# Patient Record
Sex: FEMALE | Race: WHITE | Employment: OTHER | URBAN - NONMETROPOLITAN AREA
[De-identification: names, ages, dates, MRNs, and addresses within clinical notes are randomized per-mention and may not be internally consistent; named-entity substitution may affect disease eponyms.]

---

## 2017-03-22 ENCOUNTER — OFFICE VISIT (OUTPATIENT)
Dept: FAMILY MEDICINE CLINIC | Age: 27
End: 2017-03-22
Payer: COMMERCIAL

## 2017-03-22 VITALS
RESPIRATION RATE: 16 BRPM | HEIGHT: 62 IN | HEART RATE: 90 BPM | DIASTOLIC BLOOD PRESSURE: 86 MMHG | SYSTOLIC BLOOD PRESSURE: 132 MMHG | OXYGEN SATURATION: 98 % | WEIGHT: 162.4 LBS | BODY MASS INDEX: 29.88 KG/M2

## 2017-03-22 DIAGNOSIS — N39.0 RECURRENT UTI: ICD-10-CM

## 2017-03-22 DIAGNOSIS — E04.2 MULTIPLE THYROID NODULES: ICD-10-CM

## 2017-03-22 DIAGNOSIS — F31.78 BIPOLAR DISORDER, IN FULL REMISSION, MOST RECENT EPISODE MIXED (HCC): Primary | ICD-10-CM

## 2017-03-22 PROCEDURE — 99214 OFFICE O/P EST MOD 30 MIN: CPT | Performed by: FAMILY MEDICINE

## 2017-03-22 RX ORDER — CLONAZEPAM 1 MG/1
TABLET ORAL
Refills: 0 | COMMUNITY
Start: 2017-02-13 | End: 2017-11-08 | Stop reason: ALTCHOICE

## 2017-03-22 RX ORDER — ASENAPINE MALEATE 10 MG/1
TABLET SUBLINGUAL
Refills: 0 | COMMUNITY
Start: 2017-03-02 | End: 2017-11-08 | Stop reason: ALTCHOICE

## 2017-03-22 RX ORDER — LAMOTRIGINE 25 MG/1
TABLET ORAL
Refills: 0 | COMMUNITY
Start: 2017-03-02 | End: 2017-11-08 | Stop reason: DRUGHIGH

## 2017-03-22 ASSESSMENT — ENCOUNTER SYMPTOMS
DIARRHEA: 0
WHEEZING: 0
CONSTIPATION: 0
NAUSEA: 0
SHORTNESS OF BREATH: 0
VOMITING: 0
BLOOD IN STOOL: 0

## 2017-11-07 ENCOUNTER — TELEPHONE (OUTPATIENT)
Dept: FAMILY MEDICINE CLINIC | Age: 27
End: 2017-11-07

## 2017-11-07 NOTE — TELEPHONE ENCOUNTER
Pt called- is at hospital for a group session- states she is in a \"manic phase and her BP always goes up\" was 180/100's yesterday and 150's over 100's today. Asking for an appt with Dr Juanpablo Hale or NP- routed call to preservice- did advise her to got to ER if worsening- she said it wasn't necessary.

## 2017-11-08 ENCOUNTER — OFFICE VISIT (OUTPATIENT)
Dept: FAMILY MEDICINE CLINIC | Age: 27
End: 2017-11-08
Payer: COMMERCIAL

## 2017-11-08 VITALS
HEART RATE: 80 BPM | WEIGHT: 189 LBS | SYSTOLIC BLOOD PRESSURE: 154 MMHG | HEIGHT: 62 IN | DIASTOLIC BLOOD PRESSURE: 116 MMHG | BODY MASS INDEX: 34.78 KG/M2

## 2017-11-08 DIAGNOSIS — R03.0 ELEVATED BLOOD PRESSURE READING WITHOUT DIAGNOSIS OF HYPERTENSION: Primary | ICD-10-CM

## 2017-11-08 DIAGNOSIS — F31.62 BIPOLAR DISORDER, CURRENT EPISODE MIXED, MODERATE (HCC): ICD-10-CM

## 2017-11-08 PROCEDURE — 99213 OFFICE O/P EST LOW 20 MIN: CPT | Performed by: FAMILY MEDICINE

## 2017-11-08 RX ORDER — QUETIAPINE FUMARATE 400 MG/1
300 TABLET, FILM COATED ORAL 2 TIMES DAILY
COMMUNITY
End: 2017-12-13 | Stop reason: DRUGHIGH

## 2017-11-08 RX ORDER — LAMOTRIGINE 100 MG/1
300 TABLET ORAL NIGHTLY
Refills: 0 | COMMUNITY
Start: 2017-10-18 | End: 2018-10-03 | Stop reason: ALTCHOICE

## 2017-11-08 RX ORDER — SERTRALINE HYDROCHLORIDE 100 MG/1
50 TABLET, FILM COATED ORAL NIGHTLY
Status: ON HOLD | COMMUNITY
End: 2019-08-26 | Stop reason: HOSPADM

## 2017-11-08 RX ORDER — LISINOPRIL 10 MG/1
10 TABLET ORAL DAILY
Qty: 30 TABLET | Refills: 0 | Status: SHIPPED | OUTPATIENT
Start: 2017-11-08 | End: 2017-12-13 | Stop reason: SDUPTHER

## 2017-11-08 RX ORDER — QUETIAPINE FUMARATE 100 MG/1
TABLET, FILM COATED ORAL
Refills: 0 | COMMUNITY
Start: 2017-11-03 | End: 2017-11-28 | Stop reason: DRUGHIGH

## 2017-11-08 ASSESSMENT — PATIENT HEALTH QUESTIONNAIRE - PHQ9
SUM OF ALL RESPONSES TO PHQ QUESTIONS 1-9: 0
SUM OF ALL RESPONSES TO PHQ9 QUESTIONS 1 & 2: 0
2. FEELING DOWN, DEPRESSED OR HOPELESS: 0
1. LITTLE INTEREST OR PLEASURE IN DOING THINGS: 0

## 2017-11-08 ASSESSMENT — ENCOUNTER SYMPTOMS: NAUSEA: 1

## 2017-11-08 NOTE — PATIENT INSTRUCTIONS
Patient Education        Elevated Blood Pressure: Care Instructions  Your Care Instructions    Blood pressure is a measure of how hard the blood pushes against the walls of your arteries. It's normal for blood pressure to go up and down throughout the day. But if it stays up over time, you have high blood pressure. Two numbers tell you your blood pressure. The first number is the systolic pressure. It shows how hard the blood pushes when your heart is pumping. The second number is the diastolic pressure. It shows how hard the blood pushes between heartbeats, when your heart is relaxed and filling with blood. An ideal blood pressure in adults is less than 120/80 (say \"120 over 80\"). High blood pressure is 140/90 or higher. You have high blood pressure if your top number is 140 or higher or your bottom number is 90 or higher, or both. The main test for high blood pressure is simple, fast, and painless. To diagnose high blood pressure, your doctor will test your blood pressure at different times. After testing your blood pressure, your doctor may ask you to test it again when you are home. If you are diagnosed with high blood pressure, you can work with your doctor to make a long-term plan to manage it. Follow-up care is a key part of your treatment and safety. Be sure to make and go to all appointments, and call your doctor if you are having problems. It's also a good idea to know your test results and keep a list of the medicines you take. How can you care for yourself at home? · Do not smoke. Smoking increases your risk for heart attack and stroke. If you need help quitting, talk to your doctor about stop-smoking programs and medicines. These can increase your chances of quitting for good. · Stay at a healthy weight. · Try to limit how much sodium you eat to less than 2,300 milligrams (mg) a day. Your doctor may ask you to try to eat less than 1,500 mg a day. · Be physically active.  Get at least 30

## 2017-11-08 NOTE — PROGRESS NOTES
[address] [place], [address], [place], [address] [place] [address]  [phone]      Jose Calabrese is a 33 y.o. female who presents today for her medical conditions/complaints as noted below. Jose Calabrese is c/o of Hypertension (elevated readings at [address] program)      HPI:     Over the past 3 weeks, she has been in a \"moderate manic episode\" per Dr. Caridad Keller (Psychiatry) - diagnosed 2 days ago, along with panic disorder. Has had increase in shopping habits and sexual behavior; also feels that colors and other things are very \"vivid\". Having approx 3-4 panic attacks per day - takes Xanax as needed for these. Has been seeing his wife (Dr. Nii Carmona) every month x past year; also seeing GEE (counselor) at [facility] in [place] every week. Has now been in partial hospitalization program x past 5 days - will have this for a set 5-7 days, and then see how she is doing. Goes to [facility] program from 9-3. Now taking Lamictal 300 mg nightly, Seroquel 500 mg nightly, and Zoloft 50 mg nightly. Seroquel dose was temporarily decreased, but her symptoms worsened, so then they increased the dose further; Lamictal has remained the same. Has already noticed improvement x past 3 days. Last year, when she had a severe manic episode, she had increase in BP to \"dangerous levels\". Has noticed that this has been happening again with her recent increase in manic symptoms and panic attacks. Has been waxing/waning over the past few days. Yesterday, BP was 169/109. Past Medical History:   Diagnosis Date    Bipolar disease, manic ([address]     Hypertension       History reviewed. No pertinent surgical history.   Family History   Problem Relation Age of Onset    Depression Mother     High Blood Pressure Father      Social History   Substance Use Topics    Smoking status: Former Smoker    Smokeless tobacco: Never Used    Alcohol use No      Current Outpatient Prescriptions   Medication Sig Dispense Refill    lamoTRIgine (LAMICTAL) 100 MG tablet Take 300 mg by mouth nightly  0    QUEtiapine (SEROQUEL) 100 MG tablet take 1 tablet by mouth at bedtime TAKE WITH 400 MG TAB TO EQUAL 400 MG AT BEDTIME  0    QUEtiapine (SEROQUEL) 400 MG tablet Take 400 mg by mouth nightly      sertraline (ZOLOFT) 50 MG tablet Take 50 mg by mouth nightly      lisinopril (PRINIVIL;ZESTRIL) 10 MG tablet Take 1 tablet by mouth daily 30 tablet 0    ALPRAZolam (XANAX) 1 MG tablet Take 1 tablet by mouth 4 times daily as needed  . 0    EPINEPHrine (EPIPEN 2-WENDY) 0.3 MG/0.3ML SOAJ injection Use as directed for allergic reaction 1 each 0     No current facility-administered medications for this visit. Allergies   Allergen Reactions    Bee Venom Anaphylaxis       Health Maintenance   Topic Date Due    Flu vaccine (1) 02/08/2018 (Originally 9/1/2017)    HIV screen  11/08/2018 (Originally 2/19/2006)    Cervical cancer screen  09/15/2019    DTaP/Tdap/Td vaccine (2 - Td) 07/28/2026       Subjective:      Review of Systems   Eyes: Negative for visual disturbance. Cardiovascular: Negative for leg swelling. Gastrointestinal: Positive for nausea (intermittent). Neurological: Positive for light-headedness (relates this to her Seroquel increase) and headaches (gets migraines after panic attacks; takes Midol as needed). Objective:     Vitals:    11/08/17 0820 11/08/17 0938   BP: (!) 148/108 (!) 154/116   Site: Right Arm Right Arm   Position: Sitting Sitting   Cuff Size: Large Adult Large Adult   Pulse: 80    Weight: 189 lb (85.7 kg)    Height: 5' 2\" (1.575 m)      Physical Exam   Constitutional: She is oriented to person, place, and time. She appears well-developed and well-nourished. No distress. HENT:   Head: Normocephalic and atraumatic. Eyes: Conjunctivae are normal.   Neck: Neck supple. Cardiovascular: Normal rate, regular rhythm and normal heart sounds.     Pulmonary/Chest: Effort normal and breath sounds

## 2017-11-28 ENCOUNTER — OFFICE VISIT (OUTPATIENT)
Dept: FAMILY MEDICINE CLINIC | Age: 27
End: 2017-11-28
Payer: COMMERCIAL

## 2017-11-28 ENCOUNTER — HOSPITAL ENCOUNTER (OUTPATIENT)
Dept: LAB | Age: 27
Setting detail: SPECIMEN
Discharge: HOME | End: 2017-11-28
Payer: COMMERCIAL

## 2017-11-28 VITALS
SYSTOLIC BLOOD PRESSURE: 122 MMHG | WEIGHT: 195.6 LBS | BODY MASS INDEX: 35.78 KG/M2 | OXYGEN SATURATION: 96 % | DIASTOLIC BLOOD PRESSURE: 86 MMHG | HEART RATE: 93 BPM

## 2017-11-28 DIAGNOSIS — N30.00 ACUTE CYSTITIS WITHOUT HEMATURIA: ICD-10-CM

## 2017-11-28 DIAGNOSIS — R35.0 URINARY FREQUENCY: ICD-10-CM

## 2017-11-28 DIAGNOSIS — R35.0 URINARY FREQUENCY: Primary | ICD-10-CM

## 2017-11-28 LAB
-: ABNORMAL
AMORPHOUS: ABNORMAL
BACTERIA: ABNORMAL
BILIRUBIN URINE: NEGATIVE
CASTS UA: ABNORMAL /LPF (ref 0–2)
COLOR: ABNORMAL
COMMENT UA: ABNORMAL
CRYSTALS, UA: ABNORMAL /HPF
EPITHELIAL CELLS UA: ABNORMAL /HPF (ref 0–5)
GLUCOSE URINE: ABNORMAL
KETONES, URINE: NEGATIVE
LEUKOCYTE ESTERASE, URINE: ABNORMAL
MUCUS: ABNORMAL
NITRITE, URINE: POSITIVE
OTHER OBSERVATIONS UA: ABNORMAL
PH UA: 7 (ref 5–6)
PROTEIN UA: ABNORMAL
RBC UA: ABNORMAL /HPF (ref 0–4)
RENAL EPITHELIAL, UA: ABNORMAL /HPF
SPECIFIC GRAVITY UA: 1.01 (ref 1.01–1.02)
TRICHOMONAS: ABNORMAL
TURBIDITY: ABNORMAL
URINE HGB: ABNORMAL
UROBILINOGEN, URINE: ABNORMAL
WBC UA: >50 /HPF (ref 0–4)
YEAST: ABNORMAL

## 2017-11-28 PROCEDURE — 87086 URINE CULTURE/COLONY COUNT: CPT

## 2017-11-28 PROCEDURE — 81025 URINE PREGNANCY TEST: CPT | Performed by: NURSE PRACTITIONER

## 2017-11-28 PROCEDURE — 87088 URINE BACTERIA CULTURE: CPT

## 2017-11-28 PROCEDURE — 81001 URINALYSIS AUTO W/SCOPE: CPT

## 2017-11-28 PROCEDURE — 87186 SC STD MICRODIL/AGAR DIL: CPT

## 2017-11-28 PROCEDURE — 99213 OFFICE O/P EST LOW 20 MIN: CPT | Performed by: NURSE PRACTITIONER

## 2017-11-28 RX ORDER — NITROFURANTOIN 25; 75 MG/1; MG/1
100 CAPSULE ORAL 2 TIMES DAILY
Qty: 10 CAPSULE | Refills: 0 | Status: SHIPPED | OUTPATIENT
Start: 2017-11-28 | End: 2017-12-03

## 2017-11-28 ASSESSMENT — ENCOUNTER SYMPTOMS
ABDOMINAL PAIN: 0
SHORTNESS OF BREATH: 0
CHEST TIGHTNESS: 0
VOMITING: 0

## 2017-11-28 NOTE — PROGRESS NOTES
Medications    nitrofurantoin, macrocrystal-monohydrate, (MACROBID) 100 MG capsule     Sig: Take 1 capsule by mouth 2 times daily for 5 days     Dispense:  10 capsule     Refill:  0     UTI - POCT UA positive, will treat with macrobid as above, encourage fluids. Follow up if symptoms worsen or fail to resolve. Patient given educational materials - see patient instructions. Discussed use, benefit, and side effects of prescribed medications. All patient questions answered. Pt voiced understanding.        Electronically signed by CHRISTOPHER Hinson on 11/28/2017 at 7:28 PM

## 2017-12-01 LAB
CULTURE: ABNORMAL
CULTURE: ABNORMAL
Lab: ABNORMAL
ORGANISM: ABNORMAL
SPECIMEN DESCRIPTION: ABNORMAL
SPECIMEN DESCRIPTION: ABNORMAL
STATUS: ABNORMAL

## 2017-12-13 ENCOUNTER — OFFICE VISIT (OUTPATIENT)
Dept: FAMILY MEDICINE CLINIC | Age: 27
End: 2017-12-13
Payer: COMMERCIAL

## 2017-12-13 VITALS
BODY MASS INDEX: 36.76 KG/M2 | WEIGHT: 201 LBS | DIASTOLIC BLOOD PRESSURE: 100 MMHG | OXYGEN SATURATION: 96 % | SYSTOLIC BLOOD PRESSURE: 140 MMHG | HEART RATE: 90 BPM

## 2017-12-13 DIAGNOSIS — R03.0 ELEVATED BLOOD PRESSURE READING WITHOUT DIAGNOSIS OF HYPERTENSION: Primary | ICD-10-CM

## 2017-12-13 DIAGNOSIS — F31.81 HYPOMANIC BIPOLAR II DISORDER (HCC): ICD-10-CM

## 2017-12-13 DIAGNOSIS — F41.0 PANIC DISORDER: ICD-10-CM

## 2017-12-13 PROCEDURE — 99213 OFFICE O/P EST LOW 20 MIN: CPT | Performed by: FAMILY MEDICINE

## 2017-12-13 RX ORDER — LISINOPRIL 10 MG/1
10 TABLET ORAL DAILY
Qty: 30 TABLET | Refills: 2 | Status: SHIPPED | OUTPATIENT
Start: 2017-12-13 | End: 2018-03-27 | Stop reason: SDUPTHER

## 2017-12-13 RX ORDER — QUETIAPINE FUMARATE 200 MG/1
1 TABLET, FILM COATED ORAL NIGHTLY
Refills: 0 | COMMUNITY
Start: 2017-11-30 | End: 2018-10-03 | Stop reason: ALTCHOICE

## 2017-12-13 RX ORDER — QUETIAPINE FUMARATE 300 MG/1
600 TABLET, FILM COATED ORAL NIGHTLY
COMMUNITY
End: 2018-10-03 | Stop reason: ALTCHOICE

## 2017-12-13 ASSESSMENT — ENCOUNTER SYMPTOMS
SHORTNESS OF BREATH: 0
NAUSEA: 1
COUGH: 0

## 2017-12-13 NOTE — PROGRESS NOTES
[place] [address] [address] [place], [address] [place] [address]  [phone]      Gladys Camara is a 33 y.o. female who presents today for her medical conditions/complaints as noted below. Gladys Camara is c/o of Hypertension (4 week follow-up.)      HPI:     Pt here today for follow-up of elevated BP. Pt was having normal BP readings after her last OV with me at [facility] - 120/80's while there after starting Lisinopril 10 mg daily. Last reading when she saw Dr. Mayco Carney for appt was normal as well; also just saw [address] for UTI on 11/28, and BP was 122/86. No side effects. Pt going to see her counselor twice per week, and meets with Dr. Mayco Carney once per month. Still having some dysuria since her UTI on 11/28 - progressively improving. Denies urgency or severe burning; no frequency. Only happening intermittently, but states she is about 90% improved. Using OTC cranberry tabs and another OTC UTI medication. Past Medical History:   Diagnosis Date    Bipolar disease, manic ([address]     Hypertension     Panic disorder 10/2017    Dx by Dr. Mayco Carney from [facility]      History reviewed. No pertinent surgical history. Family History   Problem Relation Age of Onset    Depression Mother     High Blood Pressure Father      Social History   Substance Use Topics    Smoking status: Former Smoker    Smokeless tobacco: Never Used    Alcohol use No      Current Outpatient Prescriptions   Medication Sig Dispense Refill    QUEtiapine (SEROQUEL) 200 MG tablet Take 1 tablet by mouth nightly  0    QUEtiapine (SEROQUEL) 300 MG tablet Take 600 mg by mouth nightly       lisinopril (PRINIVIL;ZESTRIL) 10 MG tablet Take 1 tablet by mouth daily 30 tablet 2    lamoTRIgine (LAMICTAL) 100 MG tablet Take 300 mg by mouth nightly  0    sertraline (ZOLOFT) 50 MG tablet Take 50 mg by mouth nightly      ALPRAZolam (XANAX) 1 MG tablet Take 1 tablet by mouth 4 times daily as needed  .   0    EPINEPHrine (EPIPEN 2-WENDY) 0.3 MG/0.3ML

## 2018-03-27 DIAGNOSIS — R03.0 ELEVATED BLOOD PRESSURE READING WITHOUT DIAGNOSIS OF HYPERTENSION: ICD-10-CM

## 2018-03-28 RX ORDER — LISINOPRIL 10 MG/1
TABLET ORAL
Qty: 30 TABLET | Refills: 5 | Status: SHIPPED | OUTPATIENT
Start: 2018-03-28 | End: 2018-09-24 | Stop reason: SDUPTHER

## 2018-09-24 DIAGNOSIS — R03.0 ELEVATED BLOOD PRESSURE READING WITHOUT DIAGNOSIS OF HYPERTENSION: ICD-10-CM

## 2018-09-26 RX ORDER — LISINOPRIL 10 MG/1
TABLET ORAL
Qty: 30 TABLET | Refills: 2 | Status: SHIPPED | OUTPATIENT
Start: 2018-09-26 | End: 2018-12-17 | Stop reason: SDUPTHER

## 2018-10-03 ENCOUNTER — OFFICE VISIT (OUTPATIENT)
Dept: FAMILY MEDICINE CLINIC | Age: 28
End: 2018-10-03
Payer: COMMERCIAL

## 2018-10-03 VITALS
HEART RATE: 80 BPM | WEIGHT: 219 LBS | BODY MASS INDEX: 40.3 KG/M2 | HEIGHT: 62 IN | SYSTOLIC BLOOD PRESSURE: 114 MMHG | DIASTOLIC BLOOD PRESSURE: 80 MMHG | OXYGEN SATURATION: 98 %

## 2018-10-03 DIAGNOSIS — E04.1 THYROID NODULE: ICD-10-CM

## 2018-10-03 DIAGNOSIS — I10 ESSENTIAL HYPERTENSION: Primary | ICD-10-CM

## 2018-10-03 DIAGNOSIS — K62.5 RECTAL BLEEDING: ICD-10-CM

## 2018-10-03 DIAGNOSIS — Z91.030 ALLERGY TO BEE STING: ICD-10-CM

## 2018-10-03 PROCEDURE — 99214 OFFICE O/P EST MOD 30 MIN: CPT | Performed by: FAMILY MEDICINE

## 2018-10-03 RX ORDER — DIVALPROEX SODIUM 500 MG/1
TABLET, EXTENDED RELEASE ORAL
COMMUNITY
End: 2019-08-22 | Stop reason: DRUGHIGH

## 2018-10-03 RX ORDER — EPINEPHRINE 0.3 MG/.3ML
INJECTION SUBCUTANEOUS
Qty: 1 EACH | Refills: 1 | Status: SHIPPED | OUTPATIENT
Start: 2018-10-03 | End: 2019-09-04 | Stop reason: SDUPTHER

## 2018-10-03 ASSESSMENT — ENCOUNTER SYMPTOMS
SHORTNESS OF BREATH: 0
BLOOD IN STOOL: 1

## 2018-10-03 ASSESSMENT — PATIENT HEALTH QUESTIONNAIRE - PHQ9
2. FEELING DOWN, DEPRESSED OR HOPELESS: 0
SUM OF ALL RESPONSES TO PHQ QUESTIONS 1-9: 0
SUM OF ALL RESPONSES TO PHQ9 QUESTIONS 1 & 2: 0
SUM OF ALL RESPONSES TO PHQ QUESTIONS 1-9: 0
1. LITTLE INTEREST OR PLEASURE IN DOING THINGS: 0

## 2018-10-03 NOTE — PROGRESS NOTES
Anesthesia Evaluation     Patient summary reviewed and Nursing notes reviewed   no history of anesthetic complications:  NPO Solid Status: > 8 hours  NPO Liquid Status: > 8 hours           Airway   Mallampati: II  TM distance: >3 FB  Neck ROM: full  no difficulty expected  Dental - normal exam     Pulmonary - negative pulmonary ROS and normal exam   Cardiovascular - normal exam    Rhythm: regular  Rate: normal    (+) hypertension well controlled,       Neuro/Psych- negative ROS  GI/Hepatic/Renal/Endo - negative ROS     Musculoskeletal (-) negative ROS    Abdominal    Substance History - negative use     OB/GYN negative ob/gyn ROS         Other - negative ROS                       Anesthesia Plan    ASA 2     MAC   (Pt told that intravenous sedation will be used as the primary anesthetic along with local anesthesia if necessary. Every effort will be made to make sure the patient is comfortable.     The patient was told they may or may not have recall for the procedure. It was further explained that if the MAC was not adequate that a general anesthetic with either an LMA or endotracheal tube would be required.     Will proceed with the plan of care.)  intravenous induction   Anesthetic plan and risks discussed with patient.       [address]  [phone]      Shara Mohs is a 33 y.o. female who presents today for her medical conditions/complaints as noted below. Shara Mohs is c/o of Hypertension      HPI:     Pt here today for follow-up of HTN. BP well-controlled today - 114/80. Pt has been checking her BP regularly with Dr. Ofelia Mayers (Psychiatry) at [facility] once monthly - usually 110's/80's. Taking Lisinopril 10 mg nightly for HTN - stable. Had run out of her Lisinopril for a few days, and she got HA x 2-3 days and felt like her heart was \"racing\". Taking Zoloft 50 mg nightly, along with Depakote ER 1000 mg qam and 1500 mg qpm (just changed to this approx 3 weeks ago due to serum levels) and Vraylar 6 mg daily. Dr. Ofelia Mayers prescribes all 3 of these medications - seeing her once per monthly; may be stopping her Cristina Hoyos sometime soon in the future. Also seeing counselor Victor Manuel Carrasco at [facility] in [place] every 2 weeks; does EMDR with her. Has Epi-pen at home due to allergies to bees - thinks it is likely ; needs refill for new one today. Past Medical History:   Diagnosis Date    Bipolar disease, manic ([address]     Hypertension     Panic disorder 10/2017    Dx by Dr. Ofelia Mayers from [facility]      History reviewed. No pertinent surgical history. Family History   Problem Relation Age of Onset    Depression Mother     High Blood Pressure Father      Social History   Substance Use Topics    Smoking status: Former Smoker    Smokeless tobacco: Never Used    Alcohol use No      Current Outpatient Prescriptions   Medication Sig Dispense Refill    divalproex (DEPAKOTE ER) 500 MG extended release tablet Take 2 tabs every morning and 3 tabs every evening.        Cariprazine HCl (VRAYLAR) 6 MG CAPS Take 1 capsule by mouth daily      EPINEPHrine (EPIPEN 2-WENDY) 0.3 MG/0.3ML SOAJ injection Use as directed for allergic reaction 1 each 1    lisinopril (PRINIVIL;ZESTRIL) 10 MG tablet take 1 tablet by mouth once daily 30 tablet 2    sertraline (ZOLOFT) 50 MG tablet Take 50 mg by mouth nightly       No current facility-administered medications for this visit. Allergies   Allergen Reactions    Bee Venom Anaphylaxis       Health Maintenance   Topic Date Due    Flu vaccine (1) 09/01/2018    HIV screen  11/08/2018 (Originally 2/19/2006)    Potassium monitoring  01/21/2019    Creatinine monitoring  01/21/2019    Cervical cancer screen  09/15/2019    DTaP/Tdap/Td vaccine (2 - Td) 07/28/2026       Subjective:      Review of Systems   HENT: Negative for hearing loss. Eyes: Negative for visual disturbance. Respiratory: Negative for shortness of breath. Cardiovascular: Negative for chest pain. Gastrointestinal: Positive for blood in stool (intermittent, started approx 3 weeks ago x 3-4 episodes. Blood is bright red, sometimes dark; usually when she wipes, but on one occasion, she had dripping blood into the toilet bowl. Had some pain with BM's at those times. Has not happened in past week). Genitourinary: Negative for dysuria, hematuria and menstrual problem (LMP 9/20/18 - regular). Neurological: Negative for dizziness and light-headedness. Psychiatric/Behavioral: Positive for dysphoric mood (stable). Negative for suicidal ideas. The patient is nervous/anxious (stable). Objective:     Vitals:    10/03/18 1020   BP: 114/80   Site: Right Upper Arm   Position: Sitting   Cuff Size: Large Adult   Pulse: 80   SpO2: 98%   Weight: 219 lb (99.3 kg)   Height: 5' 2\" (1.575 m)     Physical Exam   Constitutional: She is oriented to person, place, and time. She appears well-developed and well-nourished. No distress. HENT:   Head: Normocephalic and atraumatic. Eyes: Conjunctivae are normal.   Neck: Neck supple. Thyromegaly (palpable nodule in R thyroid) present. Cardiovascular: Normal rate, regular rhythm and normal heart sounds.     Pulmonary/Chest: Effort normal and breath sounds

## 2018-12-17 DIAGNOSIS — R03.0 ELEVATED BLOOD PRESSURE READING WITHOUT DIAGNOSIS OF HYPERTENSION: ICD-10-CM

## 2018-12-18 RX ORDER — LISINOPRIL 10 MG/1
TABLET ORAL
Qty: 30 TABLET | Refills: 11 | Status: ON HOLD | OUTPATIENT
Start: 2018-12-18 | End: 2019-08-26 | Stop reason: HOSPADM

## 2019-04-02 ENCOUNTER — OFFICE VISIT (OUTPATIENT)
Dept: FAMILY MEDICINE CLINIC | Age: 29
End: 2019-04-02
Payer: COMMERCIAL

## 2019-04-02 VITALS
HEIGHT: 63 IN | DIASTOLIC BLOOD PRESSURE: 70 MMHG | WEIGHT: 218.8 LBS | SYSTOLIC BLOOD PRESSURE: 106 MMHG | OXYGEN SATURATION: 96 % | HEART RATE: 112 BPM | BODY MASS INDEX: 38.77 KG/M2 | TEMPERATURE: 100.1 F

## 2019-04-02 DIAGNOSIS — J02.9 SORE THROAT: ICD-10-CM

## 2019-04-02 DIAGNOSIS — J10.1 INFLUENZA A: Primary | ICD-10-CM

## 2019-04-02 LAB
INFLUENZA A ANTIBODY: ABNORMAL
INFLUENZA B ANTIBODY: ABNORMAL

## 2019-04-02 PROCEDURE — 87804 INFLUENZA ASSAY W/OPTIC: CPT | Performed by: NURSE PRACTITIONER

## 2019-04-02 PROCEDURE — 99214 OFFICE O/P EST MOD 30 MIN: CPT | Performed by: NURSE PRACTITIONER

## 2019-04-02 RX ORDER — RISPERIDONE 2 MG/1
TABLET, FILM COATED ORAL
Refills: 0 | COMMUNITY
Start: 2019-03-12 | End: 2019-08-22

## 2019-04-02 RX ORDER — OSELTAMIVIR PHOSPHATE 75 MG/1
75 CAPSULE ORAL 2 TIMES DAILY
Qty: 10 CAPSULE | Refills: 0 | Status: SHIPPED | OUTPATIENT
Start: 2019-04-02 | End: 2019-04-07

## 2019-04-02 RX ORDER — DIVALPROEX SODIUM 500 MG/1
TABLET, DELAYED RELEASE ORAL
Refills: 0 | COMMUNITY
Start: 2019-03-28 | End: 2019-04-02

## 2019-04-02 ASSESSMENT — PATIENT HEALTH QUESTIONNAIRE - PHQ9
1. LITTLE INTEREST OR PLEASURE IN DOING THINGS: 0
SUM OF ALL RESPONSES TO PHQ9 QUESTIONS 1 & 2: 0
2. FEELING DOWN, DEPRESSED OR HOPELESS: 0
SUM OF ALL RESPONSES TO PHQ QUESTIONS 1-9: 0
SUM OF ALL RESPONSES TO PHQ QUESTIONS 1-9: 0

## 2019-04-02 NOTE — PROGRESS NOTES
record. Objective:    Physical Exam:  Vitals: /70 (Site: Right Upper Arm, Position: Sitting, Cuff Size: Large Adult)   Pulse 112   Temp 100.1 °F (37.8 °C) (Tympanic)   Ht 5' 3\" (1.6 m)   Wt 218 lb 12.8 oz (99.2 kg)   LMP 03/29/2019 (Exact Date)   SpO2 96%   Breastfeeding? No   BMI 38.76 kg/m²     LABS:  CBC:  No results for input(s): WBC, HGB, PLT in the last 72 hours. BMP:  No results for input(s): NA, K, CL, CO2, BUN, CREATININE, GLUCOSE in the last 72 hours. Hepatic:  No results for input(s): AST, ALT, ALB, BILITOT, ALKPHOS in the last 72 hours. Pertinent lab and radiology results reviewed. General Appearance: alert and interactive, well developed and well- nourished, in no acute distress  Skin: warm and dry, no rash or erythema  Head: normocephalic and atraumatic  Eyes: pupils equal, round, and reactive to light, conjunctivae normal  ENT: tympanic membrane, external ear and ear canal normal bilaterally, nose without deformity, nasal mucosa and turbinates congested, pharynx with mild erythema  Neck: supple and non-tender without mass, no thyromegaly or thyroid nodules, no cervical lymphadenopathy  Pulmonary/Chest: clear to auscultation bilaterally- no wheezes, rales or rhonchi, normal air movement, no respiratory distress  Cardiovascular: normal rate, regular rhythm, normal S1 and S2, no murmurs, rubs, clicks, or gallops, distal pulses intact  Abdomen: soft, non-tender, non-distended, normal bowel sounds, no masses or organomegaly  Extremities: no cyanosis, clubbing or edema  Musculoskeletal: normal range of motion, no joint swelling, deformity or tenderness  Neurologic: no gross cranial nerve deficit, gait, coordination normal      Assessment and Plan:  Visit Diagnoses       Codes    Influenza A    -  Primary J10.1    Sore throat     J02.9          Encounter Diagnoses   Name Primary?     Influenza A Yes    Sore throat      POCT influenza A  Tamiflu as prescribed  Increase fluid intake and rest.   OTC NSAID as needed--follow package instructions. Follow up as needed. Return or go to an urgent care or emergency room if symptoms worsen, fail to improve, or new symptoms arise. The use, risks, benefits, and side effects of prescribed or recommended medications were discussed. All questions were answered and the patient voiced understanding.        Electronically signed by CHRISTOPHER Baumann, SHAJI-BC on 4/2/2019 at 5:20 PM  Internal Medicine

## 2019-06-13 ENCOUNTER — HOSPITAL ENCOUNTER (OUTPATIENT)
Dept: LAB | Age: 29
Discharge: HOME | End: 2019-06-13
Payer: COMMERCIAL

## 2019-06-13 ENCOUNTER — OFFICE VISIT (OUTPATIENT)
Dept: OBGYN | Age: 29
End: 2019-06-13
Payer: COMMERCIAL

## 2019-06-13 VITALS
DIASTOLIC BLOOD PRESSURE: 84 MMHG | SYSTOLIC BLOOD PRESSURE: 136 MMHG | HEART RATE: 80 BPM | HEIGHT: 62 IN | WEIGHT: 233.8 LBS | BODY MASS INDEX: 43.02 KG/M2

## 2019-06-13 DIAGNOSIS — N91.2 AMENORRHEA: ICD-10-CM

## 2019-06-13 DIAGNOSIS — N91.2 AMENORRHEA: Primary | ICD-10-CM

## 2019-06-13 LAB
ALBUMIN: 4 G/DL (ref 3.5–5)
ALP BLD-CCNC: 59 U/L (ref 45–117)
ALT SERPL-CCNC: 22 U/L (ref 12–78)
AST SERPL-CCNC: 21 U/L (ref 15–37)
BASOPHILS ABSOLUTE: 0 10'3/UL (ref 0.1–0.2)
BASOPHILS RELATIVE PERCENT: 0.3 % (ref 0–1.7)
BILIRUB SERPL-MCNC: 0.3 MG/DL (ref 0–1)
BILIRUBIN DIRECT: 0.1 MG/DL (ref 0–0.2)
CHOLESTEROL/HDL RELATIVE RISK: 4.11
CHOLESTEROL: 189 MG/DL (ref 100–200)
EOSINOPHILS ABSOLUTE: 0.1 10'3/UL (ref 0–0.4)
EOSINOPHILS RELATIVE PERCENT: 1.3 % (ref 0–6.4)
GLUCOSE, FASTING: 96 MG/DL (ref 74–106)
HCG QUANTITATIVE: <1 IU/L
HCT VFR BLD CALC: 38.3 % (ref 34.6–44.1)
HDLC SERPL-MCNC: 46 MG/DL (ref 45–60)
HEMOGLOBIN: 12.6 G/DL (ref 11.7–14.9)
LDL CHOLESTEROL: 96 MG/DL (ref 88–198)
LYMPHOCYTES ABSOLUTE: 3.1 10'3/UL (ref 0.5–3.5)
LYMPHOCYTES RELATIVE PERCENT: 35.5 % (ref 17.4–45.9)
MCH RBC QN AUTO: 30 PG (ref 27.8–33.2)
MCHC RBC AUTO-ENTMCNC: 33 G/DL (ref 32.7–34.8)
MCV RBC AUTO: 90.9 FL (ref 83–97.4)
MONOCYTES ABSOLUTE: 0.8 10'3/UL (ref 0.2–0.8)
MONOCYTES RELATIVE PERCENT: 9.5 % (ref 4.4–12)
NEUTROPHILS ABSOLUTE: 4.7 10'3/UL (ref 1.5–5.6)
NEUTROPHILS SEGMENTED: 53.4 % (ref 41.2–72.1)
PDW BLD-RTO: 13.4 % (ref 12.2–15.8)
PLATELET # BLD: 259 10'3/UL (ref 122–359)
PMV BLD AUTO: 7.3 FL (ref 7.6–10.6)
RBC # BLD: 4.21 10'6/UL (ref 3.85–4.88)
THYROXINE, FREE: 1.12 NG/DL (ref 0.93–1.7)
TOTAL PROTEIN: 6.9 G/DL (ref 6.4–8.2)
TRIGL SERPL-MCNC: 235 MG/DL
TSH SERPL DL<=0.05 MIU/L-ACNC: 2.09 MIU/L (ref 0.3–5)
VALPROIC ACID LEVEL: 57 UG/ML (ref 50–100)
VLDLC SERPL CALC-MCNC: 47 MG/DL (ref 5–35)
WBC: 8.8 10'3/UL (ref 3.2–9.3)

## 2019-06-13 PROCEDURE — 84702 CHORIONIC GONADOTROPIN TEST: CPT

## 2019-06-13 PROCEDURE — 84439 ASSAY OF FREE THYROXINE: CPT

## 2019-06-13 PROCEDURE — 84146 ASSAY OF PROLACTIN: CPT

## 2019-06-13 PROCEDURE — 36415 COLL VENOUS BLD VENIPUNCTURE: CPT

## 2019-06-13 PROCEDURE — 99203 OFFICE O/P NEW LOW 30 MIN: CPT | Performed by: ADVANCED PRACTICE MIDWIFE

## 2019-06-13 PROCEDURE — 84443 ASSAY THYROID STIM HORMONE: CPT

## 2019-06-13 ASSESSMENT — ENCOUNTER SYMPTOMS
GASTROINTESTINAL NEGATIVE: 1
RESPIRATORY NEGATIVE: 1
EYES NEGATIVE: 1

## 2019-06-13 NOTE — PROGRESS NOTES
Subjective:      Patient ID: Hunter Paniagua is a 30 y.o. female. Amos Primrose presents today for concern of irregular menses and questions if prolactin is high. Her psychiatrist is concerned that her resperdone dose 1 mg. In morning and 2 mg. In the night may be causing an elevation. Prior to the medication, her menses were regular. The became irregular over the past 2-3 months. She is sexually active and uses condoms for contraception. She has gained 8# in the past month and a total of 32# in 18 months. She is treated for Bipolar disorder by Dr. Phoebe Maravilla at [facility]. Review of Systems   Constitutional: Positive for unexpected weight change. HENT: Negative. Eyes: Negative. Respiratory: Negative. Cardiovascular: Negative. Gastrointestinal: Negative. Genitourinary: Positive for menstrual problem. Musculoskeletal: Negative. Skin: Negative. Neurological: Negative. Psychiatric/Behavioral: Negative. Breast: Negative    Objective:   Physical Exam   Constitutional: She is oriented to person, place, and time. She appears well-developed and well-nourished. HENT:   Head: Normocephalic. Eyes: Conjunctivae are normal.   Neck: Normal range of motion. Cardiovascular: Normal rate, regular rhythm and normal heart sounds. Pulmonary/Chest: Effort normal and breath sounds normal.   Abdominal: Soft. Bowel sounds are normal.   Genitourinary:   Genitourinary Comments: Pelvic deferred. Musculoskeletal: Normal range of motion. Neurological: She is alert and oriented to person, place, and time. She has normal reflexes. Skin: Skin is warm and dry. Psychiatric: She has a normal mood and affect. Assessment:      Amenorrhea      Plan: Will obtain thyroid labs, prolactin and pregnancy test.  Will f/u as indicated. RTO prn.         MARCELLE Henriquez CNM

## 2019-06-14 LAB — PROLACTIN: 75.26 UG/L (ref 4.79–23.3)

## 2019-06-15 NOTE — RESULT ENCOUNTER NOTE
Abnormal test results, please notify patient. Please inform patient prolactin is high, most likely due to her medication as previously presumed.   DA/CNM

## 2019-08-22 ENCOUNTER — HOSPITAL ENCOUNTER (INPATIENT)
Age: 29
LOS: 4 days | Discharge: HOME | DRG: 885 | End: 2019-08-26
Attending: EMERGENCY MEDICINE | Admitting: PSYCHIATRY & NEUROLOGY
Payer: COMMERCIAL

## 2019-08-22 DIAGNOSIS — F31.9 BIPOLAR 1 DISORDER (HCC): Primary | ICD-10-CM

## 2019-08-22 PROCEDURE — 99285 EMERGENCY DEPT VISIT HI MDM: CPT

## 2019-08-22 PROCEDURE — 6370000000 HC RX 637 (ALT 250 FOR IP): Performed by: NURSE PRACTITIONER

## 2019-08-22 PROCEDURE — 1240000000 HC EMOTIONAL WELLNESS R&B

## 2019-08-22 RX ORDER — LISINOPRIL 10 MG/1
10 TABLET ORAL DAILY
Status: DISCONTINUED | OUTPATIENT
Start: 2019-08-22 | End: 2019-08-26 | Stop reason: HOSPADM

## 2019-08-22 RX ORDER — DIVALPROEX SODIUM 500 MG/1
1000 TABLET, DELAYED RELEASE ORAL 2 TIMES DAILY
Status: DISCONTINUED | OUTPATIENT
Start: 2019-08-22 | End: 2019-08-26 | Stop reason: HOSPADM

## 2019-08-22 RX ORDER — ARIPIPRAZOLE 5 MG/1
10 TABLET ORAL NIGHTLY
Status: ON HOLD | COMMUNITY
End: 2019-08-26 | Stop reason: HOSPADM

## 2019-08-22 RX ORDER — MAGNESIUM HYDROXIDE/ALUMINUM HYDROXICE/SIMETHICONE 120; 1200; 1200 MG/30ML; MG/30ML; MG/30ML
30 SUSPENSION ORAL EVERY 6 HOURS PRN
Status: DISCONTINUED | OUTPATIENT
Start: 2019-08-22 | End: 2019-08-26 | Stop reason: HOSPADM

## 2019-08-22 RX ORDER — ACETAMINOPHEN, ASPIRIN AND CAFFEINE 250; 250; 65 MG/1; MG/1; MG/1
1 TABLET, FILM COATED ORAL EVERY 6 HOURS PRN
Status: ON HOLD | COMMUNITY
End: 2019-08-26 | Stop reason: SDUPTHER

## 2019-08-22 RX ORDER — NICOTINE 21 MG/24HR
1 PATCH, TRANSDERMAL 24 HOURS TRANSDERMAL DAILY
Status: DISCONTINUED | OUTPATIENT
Start: 2019-08-22 | End: 2019-08-22

## 2019-08-22 RX ORDER — IBUPROFEN 400 MG/1
400 TABLET ORAL EVERY 6 HOURS PRN
Status: DISCONTINUED | OUTPATIENT
Start: 2019-08-22 | End: 2019-08-26 | Stop reason: HOSPADM

## 2019-08-22 RX ORDER — PROPRANOLOL HYDROCHLORIDE 20 MG/1
20 TABLET ORAL 3 TIMES DAILY PRN
Status: ON HOLD | COMMUNITY
End: 2019-08-26 | Stop reason: SDUPTHER

## 2019-08-22 RX ORDER — ACETAMINOPHEN 325 MG/1
650 TABLET ORAL EVERY 4 HOURS PRN
Status: DISCONTINUED | OUTPATIENT
Start: 2019-08-22 | End: 2019-08-26 | Stop reason: HOSPADM

## 2019-08-22 RX ORDER — BENZTROPINE MESYLATE 1 MG/ML
2 INJECTION INTRAMUSCULAR; INTRAVENOUS 2 TIMES DAILY PRN
Status: DISCONTINUED | OUTPATIENT
Start: 2019-08-22 | End: 2019-08-26 | Stop reason: HOSPADM

## 2019-08-22 RX ORDER — DIVALPROEX SODIUM 500 MG/1
1000 TABLET, DELAYED RELEASE ORAL 2 TIMES DAILY
Status: ON HOLD | COMMUNITY
End: 2019-08-26 | Stop reason: HOSPADM

## 2019-08-22 RX ORDER — ARIPIPRAZOLE 10 MG/1
10 TABLET ORAL NIGHTLY
Status: DISCONTINUED | OUTPATIENT
Start: 2019-08-22 | End: 2019-08-23

## 2019-08-22 RX ORDER — TRAZODONE HYDROCHLORIDE 50 MG/1
50 TABLET ORAL NIGHTLY PRN
Status: DISCONTINUED | OUTPATIENT
Start: 2019-08-22 | End: 2019-08-26 | Stop reason: HOSPADM

## 2019-08-22 RX ORDER — ACETAMINOPHEN, ASPIRIN AND CAFFEINE 250; 250; 65 MG/1; MG/1; MG/1
1 TABLET, FILM COATED ORAL EVERY 6 HOURS PRN
Status: DISCONTINUED | OUTPATIENT
Start: 2019-08-22 | End: 2019-08-26 | Stop reason: HOSPADM

## 2019-08-22 RX ORDER — PROPRANOLOL HYDROCHLORIDE 20 MG/1
20 TABLET ORAL 3 TIMES DAILY PRN
Status: DISCONTINUED | OUTPATIENT
Start: 2019-08-22 | End: 2019-08-26 | Stop reason: HOSPADM

## 2019-08-22 RX ORDER — IBUPROFEN 200 MG
400 TABLET ORAL EVERY 6 HOURS PRN
Status: ON HOLD | COMMUNITY
End: 2019-08-26 | Stop reason: SDUPTHER

## 2019-08-22 RX ADMIN — PROPRANOLOL HYDROCHLORIDE 20 MG: 20 TABLET ORAL at 22:17

## 2019-08-22 RX ADMIN — ARIPIPRAZOLE 10 MG: 10 TABLET ORAL at 22:18

## 2019-08-22 RX ADMIN — SERTRALINE HYDROCHLORIDE 50 MG: 50 TABLET ORAL at 22:21

## 2019-08-22 RX ADMIN — TRAZODONE HYDROCHLORIDE 50 MG: 50 TABLET ORAL at 22:18

## 2019-08-22 RX ADMIN — LISINOPRIL 10 MG: 10 TABLET ORAL at 22:21

## 2019-08-22 RX ADMIN — DIVALPROEX SODIUM 1000 MG: 500 TABLET, DELAYED RELEASE ORAL at 22:18

## 2019-08-22 ASSESSMENT — ENCOUNTER SYMPTOMS
SHORTNESS OF BREATH: 0
VOMITING: 0

## 2019-08-22 ASSESSMENT — SLEEP AND FATIGUE QUESTIONNAIRES
DIFFICULTY FALLING ASLEEP: YES
RESTFUL SLEEP: NO
AVERAGE NUMBER OF SLEEP HOURS: 8
DIFFICULTY STAYING ASLEEP: NO
SLEEP PATTERN: DIFFICULTY FALLING ASLEEP
DIFFICULTY ARISING: NO
DO YOU HAVE DIFFICULTY SLEEPING: YES
DO YOU USE A SLEEP AID: NO

## 2019-08-22 ASSESSMENT — PAIN DESCRIPTION - PAIN TYPE: TYPE: ACUTE PAIN

## 2019-08-22 ASSESSMENT — PATIENT HEALTH QUESTIONNAIRE - PHQ9: SUM OF ALL RESPONSES TO PHQ QUESTIONS 1-9: 12

## 2019-08-22 ASSESSMENT — PAIN SCALES - GENERAL
PAINLEVEL_OUTOF10: 0
PAINLEVEL_OUTOF10: 7

## 2019-08-22 ASSESSMENT — PAIN DESCRIPTION - LOCATION: LOCATION: NECK

## 2019-08-22 ASSESSMENT — LIFESTYLE VARIABLES: HISTORY_ALCOHOL_USE: YES

## 2019-08-22 NOTE — ED PROVIDER NOTES
grandmother is unknown. SOCIAL HISTORY       Social History     Tobacco Use    Smoking status: Former Smoker    Smokeless tobacco: Never Used    Tobacco comment: quit 2016   Substance Use Topics    Alcohol use: Yes     Comment: pt states last year daily drinker 4-6 drinks/day    Drug use: No     PHYSICAL EXAM     INITIAL VITALS: /84   Pulse 111   Temp 98.5 °F (36.9 °C)   Resp 18   Ht 5' 2\" (1.575 m)   Wt 233 lb (105.7 kg)   LMP 08/22/2019   SpO2 99%   BMI 42.62 kg/m²    Physical Exam   Constitutional: She appears well-developed and well-nourished. She appears distressed. HENT:   Head: Atraumatic. Nose: No rhinorrhea. Mouth/Throat: Oropharynx is clear and moist.   Eyes: Conjunctivae are normal. No scleral icterus. Cardiovascular: Regular rhythm and intact distal pulses. Tachycardia present. Pulmonary/Chest: Effort normal and breath sounds normal. No respiratory distress. She has no wheezes. She has no rales. Abdominal: Soft. There is no tenderness. There is no guarding. Musculoskeletal: She exhibits no tenderness or deformity. Neurological: She is alert. She has normal strength. She exhibits normal muscle tone. Coordination normal.   Skin: Capillary refill takes 2 to 3 seconds. No abrasion and no laceration noted. Psychiatric: Her behavior is normal. Her mood appears anxious. MEDICAL DECISION MAKING:   Presents with suicidal ideation and a degree of yue. Has not had alcohol in 48 hours and does not appear to be intoxicated. Patient is Medically Cleared for [facility] at 5:30 PM    Admission for Bipolar disorder is planned.     Admitted to [facility] under Dr Mireille Murcia for Bipolar Disorder         CRITICAL CARE:       PROCEDURES:    Procedures    DIAGNOSTIC RESULTS   EKG:All EKG's are interpreted by the Emergency Department Physician who either signs or Co-signs this chart in the absence of a cardiologist.        RADIOLOGY:All plain film, CT, MRI, and formal ultrasound images

## 2019-08-23 PROBLEM — F31.9 BIPOLAR 1 DISORDER (HCC): Chronic | Status: ACTIVE | Noted: 2019-08-22

## 2019-08-23 LAB
ABSOLUTE EOS #: 0.07 K/UL (ref 0–0.4)
ABSOLUTE IMMATURE GRANULOCYTE: ABNORMAL K/UL (ref 0–0.3)
ABSOLUTE LYMPH #: 3.59 K/UL (ref 1–4.8)
ABSOLUTE MONO #: 0.48 K/UL (ref 0.1–1.3)
ALBUMIN SERPL-MCNC: 4.1 G/DL (ref 3.5–5.2)
ALBUMIN/GLOBULIN RATIO: ABNORMAL (ref 1–2.5)
ALP BLD-CCNC: 59 U/L (ref 35–104)
ALT SERPL-CCNC: 32 U/L (ref 5–33)
AMPHETAMINE SCREEN URINE: NEGATIVE
ANION GAP SERPL CALCULATED.3IONS-SCNC: 11 MMOL/L (ref 9–17)
AST SERPL-CCNC: 26 U/L
ATYPICAL LYMPHOCYTE ABSOLUTE COUNT: 0.07 K/UL
ATYPICAL LYMPHOCYTES: 1 %
BARBITURATE SCREEN URINE: NEGATIVE
BASOPHILS # BLD: 0 % (ref 0–2)
BASOPHILS ABSOLUTE: 0 K/UL (ref 0–0.2)
BENZODIAZEPINE SCREEN, URINE: NEGATIVE
BILIRUB SERPL-MCNC: 0.25 MG/DL (ref 0.3–1.2)
BUN BLDV-MCNC: 12 MG/DL (ref 6–20)
BUN/CREAT BLD: ABNORMAL (ref 9–20)
BUPRENORPHINE URINE: NORMAL
CALCIUM SERPL-MCNC: 9 MG/DL (ref 8.6–10.4)
CANNABINOID SCREEN URINE: NEGATIVE
CHLORIDE BLD-SCNC: 102 MMOL/L (ref 98–107)
CHOLESTEROL/HDL RATIO: 5.2
CHOLESTEROL: 182 MG/DL
CO2: 26 MMOL/L (ref 20–31)
COCAINE METABOLITE, URINE: NEGATIVE
CREAT SERPL-MCNC: 0.68 MG/DL (ref 0.5–0.9)
DIFFERENTIAL TYPE: ABNORMAL
EOSINOPHILS RELATIVE PERCENT: 1 % (ref 0–4)
ESTIMATED AVERAGE GLUCOSE: 114 MG/DL
GFR AFRICAN AMERICAN: >60 ML/MIN
GFR NON-AFRICAN AMERICAN: >60 ML/MIN
GFR SERPL CREATININE-BSD FRML MDRD: ABNORMAL ML/MIN/{1.73_M2}
GFR SERPL CREATININE-BSD FRML MDRD: ABNORMAL ML/MIN/{1.73_M2}
GLUCOSE BLD-MCNC: 117 MG/DL (ref 70–99)
HBA1C MFR BLD: 5.6 % (ref 4–6)
HCG(URINE) PREGNANCY TEST: NEGATIVE
HCT VFR BLD CALC: 38 % (ref 36–46)
HDLC SERPL-MCNC: 35 MG/DL
HEMOGLOBIN: 12.7 G/DL (ref 12–16)
IMMATURE GRANULOCYTES: ABNORMAL %
LDL CHOLESTEROL: 77 MG/DL (ref 0–130)
LYMPHOCYTES # BLD: 52 % (ref 24–44)
MCH RBC QN AUTO: 29.4 PG (ref 26–34)
MCHC RBC AUTO-ENTMCNC: 33.3 G/DL (ref 31–37)
MCV RBC AUTO: 88.3 FL (ref 80–100)
MDMA URINE: NORMAL
METHADONE SCREEN, URINE: NEGATIVE
METHAMPHETAMINE, URINE: NORMAL
MONOCYTES # BLD: 7 % (ref 1–7)
MORPHOLOGY: NORMAL
NRBC AUTOMATED: ABNORMAL PER 100 WBC
OPIATES, URINE: NEGATIVE
OXYCODONE SCREEN URINE: NEGATIVE
PDW BLD-RTO: 13.7 % (ref 11.5–14.9)
PHENCYCLIDINE, URINE: NEGATIVE
PLATELET # BLD: 226 K/UL (ref 150–450)
PLATELET ESTIMATE: ABNORMAL
PMV BLD AUTO: 7.7 FL (ref 6–12)
POTASSIUM SERPL-SCNC: 3.6 MMOL/L (ref 3.7–5.3)
PROPOXYPHENE, URINE: NORMAL
RBC # BLD: 4.31 M/UL (ref 4–5.2)
RBC # BLD: ABNORMAL 10*6/UL
SEG NEUTROPHILS: 39 % (ref 36–66)
SEGMENTED NEUTROPHILS ABSOLUTE COUNT: 2.69 K/UL (ref 1.3–9.1)
SODIUM BLD-SCNC: 139 MMOL/L (ref 135–144)
TEST INFORMATION: NORMAL
THYROXINE, FREE: 1.38 NG/DL (ref 0.93–1.7)
TOTAL PROTEIN: 6.8 G/DL (ref 6.4–8.3)
TRICYCLIC ANTIDEPRESSANTS, UR: NORMAL
TRIGL SERPL-MCNC: 351 MG/DL
TSH SERPL DL<=0.05 MIU/L-ACNC: 2.93 MIU/L (ref 0.3–5)
VALPROIC ACID LEVEL: 50 UG/ML (ref 50–125)
VALPROIC DATE LAST DOSE: NORMAL
VALPROIC DOSE AMOUNT: NORMAL
VALPROIC TIME LAST DOSE: 800
VLDLC SERPL CALC-MCNC: ABNORMAL MG/DL (ref 1–30)
WBC # BLD: 6.9 K/UL (ref 3.5–11)
WBC # BLD: ABNORMAL 10*3/UL

## 2019-08-23 PROCEDURE — 6370000000 HC RX 637 (ALT 250 FOR IP): Performed by: PSYCHIATRY & NEUROLOGY

## 2019-08-23 PROCEDURE — 90792 PSYCH DIAG EVAL W/MED SRVCS: CPT | Performed by: PSYCHIATRY & NEUROLOGY

## 2019-08-23 PROCEDURE — 1240000000 HC EMOTIONAL WELLNESS R&B

## 2019-08-23 PROCEDURE — 80164 ASSAY DIPROPYLACETIC ACD TOT: CPT

## 2019-08-23 PROCEDURE — 6370000000 HC RX 637 (ALT 250 FOR IP): Performed by: NURSE PRACTITIONER

## 2019-08-23 PROCEDURE — 81025 URINE PREGNANCY TEST: CPT

## 2019-08-23 PROCEDURE — 83036 HEMOGLOBIN GLYCOSYLATED A1C: CPT

## 2019-08-23 PROCEDURE — 84443 ASSAY THYROID STIM HORMONE: CPT

## 2019-08-23 PROCEDURE — 84439 ASSAY OF FREE THYROXINE: CPT

## 2019-08-23 PROCEDURE — 80053 COMPREHEN METABOLIC PANEL: CPT

## 2019-08-23 PROCEDURE — 80061 LIPID PANEL: CPT

## 2019-08-23 PROCEDURE — 80307 DRUG TEST PRSMV CHEM ANLYZR: CPT

## 2019-08-23 PROCEDURE — 36415 COLL VENOUS BLD VENIPUNCTURE: CPT

## 2019-08-23 PROCEDURE — 85025 COMPLETE CBC W/AUTO DIFF WBC: CPT

## 2019-08-23 RX ORDER — HYDROXYZINE HYDROCHLORIDE 50 MG/ML
25 INJECTION, SOLUTION INTRAMUSCULAR 3 TIMES DAILY PRN
Status: DISCONTINUED | OUTPATIENT
Start: 2019-08-23 | End: 2019-08-26 | Stop reason: HOSPADM

## 2019-08-23 RX ORDER — LORAZEPAM 1 MG/1
1 TABLET ORAL 3 TIMES DAILY PRN
Status: DISCONTINUED | OUTPATIENT
Start: 2019-08-27 | End: 2019-08-26 | Stop reason: HOSPADM

## 2019-08-23 RX ORDER — CLONIDINE HYDROCHLORIDE 0.1 MG/1
0.1 TABLET ORAL 3 TIMES DAILY
Status: DISCONTINUED | OUTPATIENT
Start: 2019-08-23 | End: 2019-08-26 | Stop reason: HOSPADM

## 2019-08-23 RX ORDER — ONDANSETRON 4 MG/1
4 TABLET, FILM COATED ORAL 2 TIMES DAILY PRN
Status: DISCONTINUED | OUTPATIENT
Start: 2019-08-23 | End: 2019-08-26 | Stop reason: HOSPADM

## 2019-08-23 RX ORDER — LOPERAMIDE HYDROCHLORIDE 2 MG/1
2 CAPSULE ORAL 4 TIMES DAILY PRN
Status: DISCONTINUED | OUTPATIENT
Start: 2019-08-23 | End: 2019-08-26 | Stop reason: HOSPADM

## 2019-08-23 RX ORDER — LORAZEPAM 1 MG/1
2 TABLET ORAL EVERY 6 HOURS PRN
Status: ACTIVE | OUTPATIENT
Start: 2019-08-23 | End: 2019-08-25

## 2019-08-23 RX ORDER — BACLOFEN 10 MG/1
10 TABLET ORAL 2 TIMES DAILY
Status: DISCONTINUED | OUTPATIENT
Start: 2019-08-23 | End: 2019-08-26 | Stop reason: HOSPADM

## 2019-08-23 RX ORDER — THIAMINE MONONITRATE (VIT B1) 100 MG
100 TABLET ORAL DAILY
Status: DISCONTINUED | OUTPATIENT
Start: 2019-08-23 | End: 2019-08-26 | Stop reason: HOSPADM

## 2019-08-23 RX ORDER — ZIPRASIDONE HYDROCHLORIDE 20 MG/1
20 CAPSULE ORAL 2 TIMES DAILY WITH MEALS
Status: DISCONTINUED | OUTPATIENT
Start: 2019-08-23 | End: 2019-08-23

## 2019-08-23 RX ORDER — DICYCLOMINE HCL 20 MG
20 TABLET ORAL 4 TIMES DAILY PRN
Status: DISCONTINUED | OUTPATIENT
Start: 2019-08-23 | End: 2019-08-26 | Stop reason: HOSPADM

## 2019-08-23 RX ORDER — LANOLIN ALCOHOL/MO/W.PET/CERES
400 CREAM (GRAM) TOPICAL ONCE
Status: COMPLETED | OUTPATIENT
Start: 2019-08-23 | End: 2019-08-23

## 2019-08-23 RX ORDER — FOLIC ACID 1 MG/1
1 TABLET ORAL DAILY
Status: DISCONTINUED | OUTPATIENT
Start: 2019-08-23 | End: 2019-08-26 | Stop reason: HOSPADM

## 2019-08-23 RX ORDER — PROMETHAZINE HYDROCHLORIDE 25 MG/ML
12.5 INJECTION, SOLUTION INTRAMUSCULAR; INTRAVENOUS EVERY 4 HOURS PRN
Status: DISCONTINUED | OUTPATIENT
Start: 2019-08-23 | End: 2019-08-26 | Stop reason: HOSPADM

## 2019-08-23 RX ORDER — LORAZEPAM 1 MG/1
2 TABLET ORAL 3 TIMES DAILY PRN
Status: DISCONTINUED | OUTPATIENT
Start: 2019-08-25 | End: 2019-08-26 | Stop reason: HOSPADM

## 2019-08-23 RX ORDER — M-VIT,TX,IRON,MINS/CALC/FOLIC 27MG-0.4MG
1 TABLET ORAL DAILY
Status: DISCONTINUED | OUTPATIENT
Start: 2019-08-23 | End: 2019-08-26 | Stop reason: HOSPADM

## 2019-08-23 RX ADMIN — FOLIC ACID 1 MG: 1 TABLET ORAL at 13:59

## 2019-08-23 RX ADMIN — ACETAMINOPHEN 650 MG: 325 TABLET, FILM COATED ORAL at 05:51

## 2019-08-23 RX ADMIN — LOPERAMIDE HYDROCHLORIDE 2 MG: 2 CAPSULE ORAL at 14:00

## 2019-08-23 RX ADMIN — CLONIDINE HYDROCHLORIDE 0.1 MG: 0.1 TABLET ORAL at 21:02

## 2019-08-23 RX ADMIN — BACLOFEN 10 MG: 10 TABLET ORAL at 13:59

## 2019-08-23 RX ADMIN — PROPRANOLOL HYDROCHLORIDE 20 MG: 20 TABLET ORAL at 08:04

## 2019-08-23 RX ADMIN — VORTIOXETINE 10 MG: 10 TABLET, FILM COATED ORAL at 13:59

## 2019-08-23 RX ADMIN — LURASIDONE HYDROCHLORIDE 40 MG: 40 TABLET, FILM COATED ORAL at 17:14

## 2019-08-23 RX ADMIN — LISINOPRIL 10 MG: 10 TABLET ORAL at 08:04

## 2019-08-23 RX ADMIN — DIVALPROEX SODIUM 1000 MG: 500 TABLET, DELAYED RELEASE ORAL at 21:02

## 2019-08-23 RX ADMIN — DIVALPROEX SODIUM 1000 MG: 500 TABLET, DELAYED RELEASE ORAL at 08:04

## 2019-08-23 RX ADMIN — CLONIDINE HYDROCHLORIDE 0.1 MG: 0.1 TABLET ORAL at 14:00

## 2019-08-23 RX ADMIN — BACLOFEN 10 MG: 10 TABLET ORAL at 21:02

## 2019-08-23 RX ADMIN — MULTIPLE VITAMINS W/ MINERALS TAB 1 TABLET: TAB at 13:59

## 2019-08-23 RX ADMIN — PROPRANOLOL HYDROCHLORIDE 20 MG: 20 TABLET ORAL at 21:01

## 2019-08-23 RX ADMIN — Medication 400 MG: at 13:59

## 2019-08-23 RX ADMIN — THIAMINE HCL TAB 100 MG 100 MG: 100 TAB at 13:59

## 2019-08-23 ASSESSMENT — PAIN SCALES - GENERAL
PAINLEVEL_OUTOF10: 3
PAINLEVEL_OUTOF10: 0
PAINLEVEL_OUTOF10: 3

## 2019-08-23 ASSESSMENT — SLEEP AND FATIGUE QUESTIONNAIRES
AVERAGE NUMBER OF SLEEP HOURS: 3
RESTFUL SLEEP: NO
DO YOU HAVE DIFFICULTY SLEEPING: YES
DO YOU USE A SLEEP AID: NO
SLEEP PATTERN: DIFFICULTY FALLING ASLEEP;DIFFICULTY ARISING;DISTURBED/INTERRUPTED SLEEP;RESTLESSNESS;INSOMNIA
DIFFICULTY STAYING ASLEEP: YES
DIFFICULTY FALLING ASLEEP: YES
DIFFICULTY ARISING: NO

## 2019-08-23 ASSESSMENT — LIFESTYLE VARIABLES: HISTORY_ALCOHOL_USE: YES

## 2019-08-23 ASSESSMENT — PATIENT HEALTH QUESTIONNAIRE - PHQ9: SUM OF ALL RESPONSES TO PHQ QUESTIONS 1-9: 21

## 2019-08-23 NOTE — PLAN OF CARE
continue group therapy , medications compliance, goal setting, individualized assessments and care, continue to monitor pt on unit      SHORT-TERM GOALS:   Time frame for Short-Term Goals: 5-7 days    LONG-TERM GOALS:  Time frame for Long-Term Goals: 6 months  Members Present in Team Meeting: See [phone] Carrie Howell

## 2019-08-23 NOTE — GROUP NOTE
Group Therapy Note    Date: August 23    Group Start Time: 1000  Group End Time: 0495  Group Topic: Recreational    [facility] TRISTON Mc, HERSON        Group Therapy Note    Attendees:8/15         Patient's Goal:  Plan a structured weekend schedule. Identify positive supports and coping skills to decrease stressors. Status After Intervention:  Improved    Participation Level:  Active Listener and Interactive    Participation Quality: Appropriate and Attentive      Speech:  normal      Thought Process/Content: Logical      Affective Functioning: Congruent      Mood: euphoric      Level of consciousness:  Alert and Oriented x4      Response to Learning: Able to verbalize current knowledge/experience and Able to verbalize/acknowledge new learning      Endings: None Reported    Modes of Intervention: Education, Support and Socialization      Discipline Responsible: Psychoeducational Specialist      Signature:  Windy Cota

## 2019-08-23 NOTE — BH NOTE
Psychiatric Admission Note         Norberta Jeans is a 30 y.o. female who was admitted from the emergency department. She was having suicidal thoughts with plan to cut her wrist.  She is having some of the issues from the past and she is having mood swings and panic attacks. She wants to and all of this by killing herself. She feels depressed and sad. She complains of lack of energy and motivation. She complains of agitation and anxiety. Patient said that she has a history of bipolar disorder for long time. She has been drinking every day at least 4-5 shots of vodka. She is living with her . Who is working for [employer]. Her parents are supportive. She has no children. She has a college degree. She is not working. She denies any other drug use. She denies any legal problems. Past Psychiatric History   Patient reports current outpatient psychiatric linkage. . Reported history of psychiatric inpatient hospitalizations. Reported history of suicide attempts. History of Substance Abuse     Denied alcohol use. Reported use of Vodka    Family History of psychiatric disorders    Family history: positive for addictions      Medical History   Allergies:  Bee venom   Past Medical History:   Diagnosis Date    Anxiety     Bipolar disease, manic ([address]     Hypertension     Panic disorder 10/2017    Dx by Dr. Nya Vazquez from [facility]      History reviewed. No pertinent surgical history.   Neurologic Exam    Labs  Recent Results (from the past 72 hour(s))   Pregnancy, urine    Collection Time: 08/22/19  8:00 PM   Result Value Ref Range    HCG(Urine) Pregnancy Test NEGATIVE NEGATIVE   Comprehensive Metabolic Panel w/ Reflex to MG    Collection Time: 08/23/19  7:27 AM   Result Value Ref Range    Glucose 117 (H) 70 - 99 mg/dL    BUN 12 6 - 20 mg/dL    CREATININE 0.68 0.50 - 0.90 mg/dL    Bun/Cre Ratio NOT REPORTED 9 - 20    Calcium 9.0 8.6 - 10.4 mg/dL    Sodium 139 135 - 144 mmol/L    Potassium 3.6 (L) 3.7 - 5.3 mmol/L    Chloride 102 98 - 107 mmol/L    CO2 26 20 - 31 mmol/L    Anion Gap 11 9 - 17 mmol/L    Alkaline Phosphatase 59 35 - 104 U/L    ALT 32 5 - 33 U/L    AST 26 <32 U/L    Total Bilirubin 0.25 (L) 0.3 - 1.2 mg/dL    Total Protein 6.8 6.4 - 8.3 g/dL    Alb 4.1 3.5 - 5.2 g/dL    Albumin/Globulin Ratio NOT REPORTED 1.0 - 2.5    GFR Non-African American >60 >60 mL/min    GFR African American >60 >60 mL/min    GFR Comment          GFR Staging NOT REPORTED    TSH without Reflex    Collection Time: 08/23/19  7:27 AM   Result Value Ref Range    TSH 2.93 0.30 - 5.00 mIU/L   T4, free    Collection Time: 08/23/19  7:27 AM   Result Value Ref Range    Thyroxine, Free 1.38 0.93 - 1.70 ng/dL   Lipid panel - fasting    Collection Time: 08/23/19  7:27 AM   Result Value Ref Range    Cholesterol 182 <200 mg/dL    HDL 35 (L) >40 mg/dL    LDL Cholesterol 77 0 - 130 mg/dL    Chol/HDL Ratio 5.2 (H) <5    Triglycerides 351 (H) <150 mg/dL    VLDL NOT REPORTED (H) 1 - 30 mg/dL   CBC auto differential    Collection Time: 08/23/19  7:27 AM   Result Value Ref Range    WBC 6.9 3.5 - 11.0 k/uL    RBC 4.31 4.0 - 5.2 m/uL    Hemoglobin 12.7 12.0 - 16.0 g/dL    Hematocrit 38.0 36 - 46 %    MCV 88.3 80 - 100 fL    MCH 29.4 26 - 34 pg    MCHC 33.3 31 - 37 g/dL    RDW 13.7 11.5 - 14.9 %    Platelets 315 886 - 809 k/uL    MPV 7.7 6.0 - 12.0 fL    NRBC Automated NOT REPORTED per 100 WBC    Differential Type NOT REPORTED     Immature Granulocytes NOT REPORTED 0 %    Absolute Immature Granulocyte NOT REPORTED 0.00 - 0.30 k/uL    WBC Morphology NOT REPORTED     RBC Morphology NOT REPORTED     Platelet Estimate NOT REPORTED     Seg Neutrophils 39 36 - 66 %    Lymphocytes 52 (H) 24 - 44 %    Atypical Lymphocytes 1 %    Monocytes 7 1 - 7 %    Eosinophils % 1 0 - 4 %    Basophils 0 0 - 2 %    Segs Absolute 2.69 1.3 - 9.1 k/uL    Absolute Lymph # 3.59 1.0 - 4.8 k/uL    Atypical Lymphocytes Absolute 0.07 k/uL

## 2019-08-23 NOTE — GROUP NOTE
Group Therapy Note    Date: August 23    Group Start Time: 0400  Group End Time: 0430  Group Topic: Psychoeducation    [facility]    MEDHAT Cordero        Group Therapy Note    Attendees: 10         Patient's Goal:  Mindfulness therapy/affirmations     Notes:      Status After Intervention:  Unchanged    Participation Level:  Active Listener    Participation Quality: Appropriate and Attentive      Speech:  normal      Thought Process/Content: Logical      Affective Functioning: Congruent      Mood: euthymic      Level of consciousness:  Alert and Oriented x4      Response to Learning: Able to verbalize current knowledge/experience and Able to verbalize/acknowledge new learning      Endings: None Reported    Modes of Intervention: Education, Support and Socialization      Discipline Responsible: /Counselor      Signature:  MEDHAT Cordero

## 2019-08-23 NOTE — H&P
system:  No chest pain, palpitation, shortness of breath, coughing or expectoration. Gastrointestinal tract: No abdominal pain, nausea, vomiting, dysphagia, diarrhea or constipation. Genitourinary:  No burning on micturition. No hesitancy, urgency, frequency or discoloration of urine. Locomotor:  No bone or joint pains. No swelling or deformities. Neuropsychiatric:  See HPI. GENERAL PHYSICAL EXAM:     Vitals: /81   Pulse 80   Temp 97.9 °F (36.6 °C) (Oral)   Resp 14   Ht 5' 2\" (1.575 m)   Wt 233 lb (105.7 kg)   LMP 08/22/2019   SpO2 99%   BMI 42.62 kg/m²  Body mass index is 42.62 kg/m². Pt was examined with a nurse present in the room. GENERAL APPEARANCE:  Steve Pinon is 30 y.o.,  female, moderately obese, nourished, conscious, alert. Does not appear to be distress or pain at this time. SKIN:  Warm, dry, no cyanosis or jaundice. HEAD:  Normocephalic, atraumatic, no swelling or tenderness. EYES:  Pupils equal, reactive to light, Conjunctiva is clear, EOMs intact chris. eyelids WNL. EARS:  No discharge, no marked hearing loss. NOSE:  No rhinorrhea, epistaxis or septal deformity. THROAT:  Not congested. No ulceration bleeding or discharge. NECK:  No stiffness, trachea central.  No palpable masses or L.N.      CHEST:  Symmetrical and equal on expansion. HEART:  Regular rate and rhythm. S1 > S2, No audible murmurs or gallops. LUNGS:  Equal on expansion, normal breath sounds. No adventitious sounds. ABDOMEN:  Obese. Soft on palpation. No localized tenderness. No guarding or rigidity. No palpable organomegaly. LYMPHATICS:  No palpable cervical Lymphadenopathy. LOCOMOTOR, BACK AND SPINE:  No tenderness or deformities. EXTREMITIES:  Symmetrical, no pretibial edema. Larrys sign negative. No discoloration or ulcerations.     NEUROLOGIC:  The patient is conscious, alert, oriented,Cranial nerve II-XII intact,

## 2019-08-24 PROCEDURE — 99232 SBSQ HOSP IP/OBS MODERATE 35: CPT | Performed by: PSYCHIATRY & NEUROLOGY

## 2019-08-24 PROCEDURE — 6370000000 HC RX 637 (ALT 250 FOR IP): Performed by: NURSE PRACTITIONER

## 2019-08-24 PROCEDURE — 1240000000 HC EMOTIONAL WELLNESS R&B

## 2019-08-24 PROCEDURE — 6370000000 HC RX 637 (ALT 250 FOR IP): Performed by: PSYCHIATRY & NEUROLOGY

## 2019-08-24 RX ADMIN — LURASIDONE HYDROCHLORIDE 40 MG: 40 TABLET, FILM COATED ORAL at 17:58

## 2019-08-24 RX ADMIN — DIVALPROEX SODIUM 1000 MG: 500 TABLET, DELAYED RELEASE ORAL at 08:54

## 2019-08-24 RX ADMIN — VORTIOXETINE 10 MG: 10 TABLET, FILM COATED ORAL at 08:54

## 2019-08-24 RX ADMIN — LISINOPRIL 10 MG: 10 TABLET ORAL at 08:54

## 2019-08-24 RX ADMIN — TRAZODONE HYDROCHLORIDE 50 MG: 50 TABLET ORAL at 22:14

## 2019-08-24 RX ADMIN — BACLOFEN 10 MG: 10 TABLET ORAL at 22:13

## 2019-08-24 RX ADMIN — CLONIDINE HYDROCHLORIDE 0.1 MG: 0.1 TABLET ORAL at 22:12

## 2019-08-24 RX ADMIN — DIVALPROEX SODIUM 1000 MG: 500 TABLET, DELAYED RELEASE ORAL at 22:14

## 2019-08-24 RX ADMIN — FOLIC ACID 1 MG: 1 TABLET ORAL at 08:54

## 2019-08-24 RX ADMIN — THIAMINE HCL TAB 100 MG 100 MG: 100 TAB at 08:54

## 2019-08-24 RX ADMIN — PROPRANOLOL HYDROCHLORIDE 20 MG: 20 TABLET ORAL at 22:12

## 2019-08-24 RX ADMIN — CLONIDINE HYDROCHLORIDE 0.1 MG: 0.1 TABLET ORAL at 08:54

## 2019-08-24 RX ADMIN — BACLOFEN 10 MG: 10 TABLET ORAL at 08:54

## 2019-08-24 RX ADMIN — MULTIPLE VITAMINS W/ MINERALS TAB 1 TABLET: TAB at 08:54

## 2019-08-24 NOTE — GROUP NOTE
Group Therapy Note     Date: August 24  Group Start Time: 930am  Group End Time: 955am  Group Topic: Community meeting     [facility]AM[facility] [place], [address]           Group Therapy Note     Attendees: 8/16        Patient's Goal:  To increase social interaction and to explore and identify short term goals r/t wellness and recovery. RT discussed group schedule and unit structure/resources and encouraged pts to be engaged in their treatment. Pts were given opportunities to ask questions. Notes: Pt participated in group task and was able to identify short term goals r/t wellness and recovery . Pt was pleasant on approach and supportive of peers. Status After Intervention:  Improved     Participation Level:  Active Listener and Interactive     Participation Quality: Appropriate, Attentive, Sharing         Speech:  Normal     Thought Process/Content: Logical, linear     Affective Functioning: Congruent     Mood: euthymic        Level of consciousness:  Alert, and Attentive        Response to Learning: Able to verbalize current knowledge/experience, Able to verbalize/acknowledge new learning, and Progressing to goal        Endings: None Reported     Modes of Intervention: Education, Support, Socialization, Exploration, Clarifying and Problem-solving        Discipline Responsible: Psychoeducational Specialist        Signature:  Beryl Grullon

## 2019-08-25 PROCEDURE — 6370000000 HC RX 637 (ALT 250 FOR IP): Performed by: PSYCHIATRY & NEUROLOGY

## 2019-08-25 PROCEDURE — 6370000000 HC RX 637 (ALT 250 FOR IP): Performed by: NURSE PRACTITIONER

## 2019-08-25 PROCEDURE — 99232 SBSQ HOSP IP/OBS MODERATE 35: CPT | Performed by: PSYCHIATRY & NEUROLOGY

## 2019-08-25 PROCEDURE — 1240000000 HC EMOTIONAL WELLNESS R&B

## 2019-08-25 RX ADMIN — BACLOFEN 10 MG: 10 TABLET ORAL at 08:10

## 2019-08-25 RX ADMIN — MULTIPLE VITAMINS W/ MINERALS TAB 1 TABLET: TAB at 08:10

## 2019-08-25 RX ADMIN — THIAMINE HCL TAB 100 MG 100 MG: 100 TAB at 08:10

## 2019-08-25 RX ADMIN — LURASIDONE HYDROCHLORIDE 40 MG: 40 TABLET, FILM COATED ORAL at 17:32

## 2019-08-25 RX ADMIN — DIVALPROEX SODIUM 1000 MG: 500 TABLET, DELAYED RELEASE ORAL at 20:33

## 2019-08-25 RX ADMIN — VORTIOXETINE 10 MG: 10 TABLET, FILM COATED ORAL at 08:10

## 2019-08-25 RX ADMIN — CLONIDINE HYDROCHLORIDE 0.1 MG: 0.1 TABLET ORAL at 08:10

## 2019-08-25 RX ADMIN — PROPRANOLOL HYDROCHLORIDE 20 MG: 20 TABLET ORAL at 20:33

## 2019-08-25 RX ADMIN — FOLIC ACID 1 MG: 1 TABLET ORAL at 08:10

## 2019-08-25 RX ADMIN — ACETAMINOPHEN 650 MG: 325 TABLET, FILM COATED ORAL at 05:25

## 2019-08-25 RX ADMIN — CLONIDINE HYDROCHLORIDE 0.1 MG: 0.1 TABLET ORAL at 20:33

## 2019-08-25 RX ADMIN — LISINOPRIL 10 MG: 10 TABLET ORAL at 08:10

## 2019-08-25 RX ADMIN — BACLOFEN 10 MG: 10 TABLET ORAL at 20:33

## 2019-08-25 RX ADMIN — CLONIDINE HYDROCHLORIDE 0.1 MG: 0.1 TABLET ORAL at 14:17

## 2019-08-25 RX ADMIN — DIVALPROEX SODIUM 1000 MG: 500 TABLET, DELAYED RELEASE ORAL at 08:10

## 2019-08-25 ASSESSMENT — PAIN DESCRIPTION - LOCATION: LOCATION: BACK

## 2019-08-25 ASSESSMENT — PAIN SCALES - GENERAL
PAINLEVEL_OUTOF10: 0
PAINLEVEL_OUTOF10: 3

## 2019-08-25 ASSESSMENT — PAIN DESCRIPTION - PAIN TYPE: TYPE: ACUTE PAIN

## 2019-08-26 VITALS
BODY MASS INDEX: 42.88 KG/M2 | HEART RATE: 73 BPM | WEIGHT: 233 LBS | TEMPERATURE: 97.7 F | RESPIRATION RATE: 14 BRPM | SYSTOLIC BLOOD PRESSURE: 126 MMHG | OXYGEN SATURATION: 97 % | DIASTOLIC BLOOD PRESSURE: 88 MMHG | HEIGHT: 62 IN

## 2019-08-26 PROCEDURE — 6370000000 HC RX 637 (ALT 250 FOR IP): Performed by: NURSE PRACTITIONER

## 2019-08-26 PROCEDURE — 99239 HOSP IP/OBS DSCHRG MGMT >30: CPT | Performed by: PSYCHIATRY & NEUROLOGY

## 2019-08-26 PROCEDURE — 6370000000 HC RX 637 (ALT 250 FOR IP): Performed by: PSYCHIATRY & NEUROLOGY

## 2019-08-26 RX ORDER — TRAZODONE HYDROCHLORIDE 50 MG/1
50 TABLET ORAL NIGHTLY PRN
Qty: 30 TABLET | Refills: 0 | Status: SHIPPED | OUTPATIENT
Start: 2019-08-26 | End: 2019-09-04

## 2019-08-26 RX ORDER — DIVALPROEX SODIUM 500 MG/1
1000 TABLET, DELAYED RELEASE ORAL 2 TIMES DAILY
Qty: 120 TABLET | Refills: 0 | Status: SHIPPED | OUTPATIENT
Start: 2019-08-26 | End: 2021-09-24 | Stop reason: ALTCHOICE

## 2019-08-26 RX ORDER — PROPRANOLOL HYDROCHLORIDE 20 MG/1
20 TABLET ORAL 3 TIMES DAILY PRN
Qty: 90 TABLET | Refills: 0 | Status: SHIPPED | OUTPATIENT
Start: 2019-08-26

## 2019-08-26 RX ORDER — ACETAMINOPHEN, ASPIRIN AND CAFFEINE 250; 250; 65 MG/1; MG/1; MG/1
1 TABLET, FILM COATED ORAL EVERY 6 HOURS PRN
Qty: 60 TABLET | Refills: 0 | Status: SHIPPED | OUTPATIENT
Start: 2019-08-26 | End: 2019-09-04

## 2019-08-26 RX ORDER — IBUPROFEN 200 MG
400 TABLET ORAL EVERY 6 HOURS PRN
Qty: 60 TABLET | Refills: 0 | Status: SHIPPED | OUTPATIENT
Start: 2019-08-26 | End: 2019-09-04

## 2019-08-26 RX ORDER — LISINOPRIL 10 MG/1
10 TABLET ORAL DAILY
Qty: 30 TABLET | Refills: 0 | Status: SHIPPED | OUTPATIENT
Start: 2019-08-27 | End: 2019-09-04 | Stop reason: SDUPTHER

## 2019-08-26 RX ADMIN — VORTIOXETINE 10 MG: 10 TABLET, FILM COATED ORAL at 08:19

## 2019-08-26 RX ADMIN — CLONIDINE HYDROCHLORIDE 0.1 MG: 0.1 TABLET ORAL at 08:19

## 2019-08-26 RX ADMIN — BACLOFEN 10 MG: 10 TABLET ORAL at 08:19

## 2019-08-26 RX ADMIN — FOLIC ACID 1 MG: 1 TABLET ORAL at 08:18

## 2019-08-26 RX ADMIN — MULTIPLE VITAMINS W/ MINERALS TAB 1 TABLET: TAB at 08:18

## 2019-08-26 RX ADMIN — ACETAMINOPHEN 650 MG: 325 TABLET, FILM COATED ORAL at 03:45

## 2019-08-26 RX ADMIN — DIVALPROEX SODIUM 1000 MG: 500 TABLET, DELAYED RELEASE ORAL at 08:18

## 2019-08-26 RX ADMIN — THIAMINE HCL TAB 100 MG 100 MG: 100 TAB at 08:18

## 2019-08-26 RX ADMIN — LISINOPRIL 10 MG: 10 TABLET ORAL at 08:19

## 2019-08-26 ASSESSMENT — PAIN - FUNCTIONAL ASSESSMENT
PAIN_FUNCTIONAL_ASSESSMENT: 0-10
PAIN_FUNCTIONAL_ASSESSMENT: 0-10

## 2019-08-26 ASSESSMENT — PAIN SCALES - GENERAL: PAINLEVEL_OUTOF10: 6

## 2019-08-26 NOTE — DISCHARGE INSTR - COC
Elimination:  Continence:   · Bowel: Yes  · Bladder: Yes  Urinary Catheter: None   Colostomy/Ileostomy/Ileal Conduit: No       Date of Last BM: 8/26/19  No intake or output data in the 24 hours ending 08/26/19 1009  No intake/output data recorded.     Safety Concerns:     None    Impairments/Disabilities:      None    Nutrition Therapy:  Current Nutrition Therapy:   - Oral Diet:  General    Routes of Feeding: Oral  Liquids: No Restrictions  Daily Fluid Restriction: no  Last Modified Barium Swallow with Video (Video Swallowing Test):     Treatments at the Time of Hospital Discharge:   Respiratory Treatments: n/a  Oxygen Therapy:  n/a  Ventilator:    - n/a    Rehab Therapies: n/a  Weight Bearing Status/Restrictions: No weight bearing restirctions  Other Medical Equipment (for information only, NOT a DME order):  n/a  Other Treatments: n/a    Patient's personal belongings (please select all that are sent with patient):  n/a    RN SIGNATURE:  Electronically signed by Debbie Chiu LPN on 1/32/18 at 83:43 AM    CASE MANAGEMENT/SOCIAL WORK SECTION    Inpatient Status Date: 8/26/19    Readmission Risk Assessment Score:  Readmission Risk              Risk of Unplanned Readmission:        6           Discharging to Facility/ Agency   · Name:   · Address:  · Phone:  · Fax:    Dialysis Facility (if applicable)   · Name:  · Address:  · Dialysis Schedule:  · Phone:  · Fax:    / signature: {Esignature:[id number]}

## 2019-08-27 ENCOUNTER — TELEPHONE (OUTPATIENT)
Dept: FAMILY MEDICINE CLINIC | Age: 29
End: 2019-08-27

## 2019-09-04 ENCOUNTER — OFFICE VISIT (OUTPATIENT)
Dept: FAMILY MEDICINE CLINIC | Age: 29
End: 2019-09-04
Payer: COMMERCIAL

## 2019-09-04 ENCOUNTER — HOSPITAL ENCOUNTER (OUTPATIENT)
Age: 29
Setting detail: SPECIMEN
Discharge: HOME | End: 2019-09-04
Payer: COMMERCIAL

## 2019-09-04 VITALS
BODY MASS INDEX: 42.88 KG/M2 | HEIGHT: 62 IN | HEART RATE: 83 BPM | WEIGHT: 233 LBS | SYSTOLIC BLOOD PRESSURE: 124 MMHG | OXYGEN SATURATION: 100 % | DIASTOLIC BLOOD PRESSURE: 78 MMHG

## 2019-09-04 DIAGNOSIS — I10 ESSENTIAL HYPERTENSION: ICD-10-CM

## 2019-09-04 DIAGNOSIS — Z91.030 ALLERGY TO BEE STING: ICD-10-CM

## 2019-09-04 DIAGNOSIS — N89.8 VAGINAL DISCHARGE: ICD-10-CM

## 2019-09-04 DIAGNOSIS — F31.9 BIPOLAR 1 DISORDER (HCC): Primary | Chronic | ICD-10-CM

## 2019-09-04 PROCEDURE — 99495 TRANSJ CARE MGMT MOD F2F 14D: CPT | Performed by: FAMILY MEDICINE

## 2019-09-04 PROCEDURE — 1111F DSCHRG MED/CURRENT MED MERGE: CPT | Performed by: FAMILY MEDICINE

## 2019-09-04 PROCEDURE — 87510 GARDNER VAG DNA DIR PROBE: CPT

## 2019-09-04 PROCEDURE — 87660 TRICHOMONAS VAGIN DIR PROBE: CPT

## 2019-09-04 PROCEDURE — 87480 CANDIDA DNA DIR PROBE: CPT

## 2019-09-04 RX ORDER — EPINEPHRINE 0.3 MG/.3ML
INJECTION SUBCUTANEOUS
Qty: 1 EACH | Refills: 1 | Status: SHIPPED | OUTPATIENT
Start: 2019-09-04 | End: 2020-09-01 | Stop reason: SDUPTHER

## 2019-09-04 RX ORDER — LISINOPRIL 10 MG/1
10 TABLET ORAL DAILY
Qty: 30 TABLET | Refills: 5 | Status: SHIPPED | OUTPATIENT
Start: 2019-09-04 | End: 2020-03-30 | Stop reason: SDUPTHER

## 2019-09-04 RX ORDER — BENZTROPINE MESYLATE 0.5 MG/1
TABLET ORAL
Refills: 0 | COMMUNITY
Start: 2019-08-30 | End: 2020-03-30 | Stop reason: ALTCHOICE
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
## 2019-09-05 LAB
DIRECT EXAM: NORMAL
Lab: NORMAL
SPECIMEN DESCRIPTION: NORMAL

## 2019-11-06 ENCOUNTER — OFFICE VISIT (OUTPATIENT)
Dept: FAMILY MEDICINE CLINIC | Age: 29
End: 2019-11-06
Payer: COMMERCIAL

## 2019-11-06 ENCOUNTER — HOSPITAL ENCOUNTER (OUTPATIENT)
Age: 29
Setting detail: SPECIMEN
Discharge: HOME | End: 2019-11-06
Payer: COMMERCIAL

## 2019-11-06 VITALS
WEIGHT: 243 LBS | SYSTOLIC BLOOD PRESSURE: 110 MMHG | BODY MASS INDEX: 44.72 KG/M2 | OXYGEN SATURATION: 97 % | DIASTOLIC BLOOD PRESSURE: 68 MMHG | HEIGHT: 62 IN | HEART RATE: 73 BPM

## 2019-11-06 DIAGNOSIS — Z01.419 ENCOUNTER FOR WELL WOMAN EXAM WITH ROUTINE GYNECOLOGICAL EXAM: Primary | ICD-10-CM

## 2019-11-06 DIAGNOSIS — B35.3 TINEA PEDIS OF LEFT FOOT: ICD-10-CM

## 2019-11-06 DIAGNOSIS — Z12.4 PAP SMEAR FOR CERVICAL CANCER SCREENING: ICD-10-CM

## 2019-11-06 DIAGNOSIS — E78.1 HYPERTRIGLYCERIDEMIA: ICD-10-CM

## 2019-11-06 DIAGNOSIS — Z79.899 USE OF MEDICATION WITH TERATOGENIC POTENTIAL IN FEMALE OF REPRODUCTIVE AGE: ICD-10-CM

## 2019-11-06 DIAGNOSIS — E87.6 HYPOKALEMIA: ICD-10-CM

## 2019-11-06 DIAGNOSIS — N94.6 DYSMENORRHEA: ICD-10-CM

## 2019-11-06 PROCEDURE — 99395 PREV VISIT EST AGE 18-39: CPT | Performed by: FAMILY MEDICINE

## 2019-11-06 PROCEDURE — G0123 SCREEN CERV/VAG THIN LAYER: HCPCS

## 2019-11-06 RX ORDER — SERTRALINE HYDROCHLORIDE 25 MG/1
25 TABLET, FILM COATED ORAL DAILY
COMMUNITY

## 2019-11-06 RX ORDER — LEVONORGESTREL AND ETHINYL ESTRADIOL 0.1-0.02MG
1 KIT ORAL DAILY
Qty: 3 PACKET | Refills: 0 | Status: SHIPPED | OUTPATIENT
Start: 2019-11-06 | End: 2020-03-30 | Stop reason: SDUPTHER

## 2019-11-06 ASSESSMENT — ENCOUNTER SYMPTOMS: BLOOD IN STOOL: 0

## 2019-11-15 LAB — CYTOLOGY REPORT: NORMAL

## 2020-01-31 ENCOUNTER — OFFICE VISIT (OUTPATIENT)
Dept: FAMILY MEDICINE CLINIC | Age: 30
End: 2020-01-31
Payer: COMMERCIAL

## 2020-01-31 VITALS
TEMPERATURE: 97.6 F | OXYGEN SATURATION: 97 % | WEIGHT: 236.2 LBS | RESPIRATION RATE: 12 BRPM | DIASTOLIC BLOOD PRESSURE: 80 MMHG | SYSTOLIC BLOOD PRESSURE: 110 MMHG | HEART RATE: 81 BPM | HEIGHT: 63 IN | BODY MASS INDEX: 41.85 KG/M2

## 2020-01-31 PROCEDURE — 99213 OFFICE O/P EST LOW 20 MIN: CPT | Performed by: NURSE PRACTITIONER

## 2020-01-31 RX ORDER — ESZOPICLONE 3 MG/1
3 TABLET, FILM COATED ORAL NIGHTLY
COMMUNITY
End: 2020-09-01 | Stop reason: CLARIF

## 2020-01-31 ASSESSMENT — ENCOUNTER SYMPTOMS
ABDOMINAL PAIN: 0
DIARRHEA: 1
FLATUS: 1
NAUSEA: 1
VOMITING: 0
BLOATING: 0

## 2020-01-31 NOTE — PATIENT INSTRUCTIONS
Patient Education        Hemorrhoids: Care Instructions  Your Care Instructions    Hemorrhoids are enlarged veins that develop in the anal canal. Bleeding during bowel movements, itching, swelling, and rectal pain are the most common symptoms. They can be uncomfortable at times, but hemorrhoids rarely are a serious problem. You can treat most hemorrhoids with simple changes to your diet and bowel habits. These changes include eating more fiber and not straining to pass stools. Most hemorrhoids do not need surgery or other treatment unless they are very large and painful or bleed a lot. Follow-up care is a key part of your treatment and safety. Be sure to make and go to all appointments, and call your doctor if you are having problems. It's also a good idea to know your test results and keep a list of the medicines you take. How can you care for yourself at home? · Sit in a few inches of warm water (sitz bath) 3 times a day and after bowel movements. The warm water helps with pain and itching. · Put ice on your anal area several times a day for 10 minutes at a time. Put a thin cloth between the ice and your skin. Follow this by placing a warm, wet towel on the area for another 10 to 20 minutes. · Take pain medicines exactly as directed. ? If the doctor gave you a prescription medicine for pain, take it as prescribed. ? If you are not taking a prescription pain medicine, ask your doctor if you can take an over-the-counter medicine. · Keep the anal area clean, but be gentle. Use water and a fragrance-free soap, such as [place], or use baby wipes or medicated pads, such as Tucks. · Wear cotton underwear and loose clothing to decrease moisture in the anal area. · Eat more fiber. Include foods such as whole-grain breads and cereals, raw vegetables, raw and dried fruits, and beans. · Drink plenty of fluids, enough so that your urine is light yellow or clear like water.  If you have kidney, heart, or liver disease and have to limit fluids, talk with your doctor before you increase the amount of fluids you drink. · Use a stool softener that contains bran or psyllium. You can save money by buying bran or psyllium (available in bulk at most health food stores) and sprinkling it on foods or stirring it into fruit juice. Or you can use a product such as Metamucil or Hydrocil. · Practice healthy bowel habits. ? Go to the bathroom as soon as you have the urge. ? Avoid straining to pass stools. Relax and give yourself time to let things happen naturally. ? Do not hold your breath while passing stools. ? Do not read while sitting on the toilet. Get off the toilet as soon as you have finished. · Take your medicines exactly as prescribed. Call your doctor if you think you are having a problem with your medicine. When should you call for help? Call 911 anytime you think you may need emergency care. For example, call if:    · You pass maroon or very bloody stools.    Call your doctor now or seek immediate medical care if:    · You have increased pain.     · You have increased bleeding.    Watch closely for changes in your health, and be sure to contact your doctor if:    · Your symptoms have not improved after 3 or 4 days. Where can you learn more? Go to https://"[employer].MakeSpace. org and sign in to your [employer] account. Enter E519 in the [facility] box to learn more about \"Hemorrhoids: Care Instructions. \"     If you do not have an account, please click on the \"Sign Up Now\" link. Current as of: August 11, 2019  Content Version: 12.3  © 8526-2885 [employer]wise[employer] Incorporated. Care instructions adapted under license by [facility] ([facility]). If you have questions about a medical condition or this instruction, always ask your healthcare professional. [address] any warranty or liability for your use of this information.

## 2020-01-31 NOTE — PROGRESS NOTES
2020     Ireen Gomez (:  1990) is a 30 y.o. female, here for evaluation of the following medical concerns:    Diarrhea    This is a new problem. The current episode started more than 1 month ago (6 months). The problem occurs 2 to 4 times per day (4-5 times). The stool consistency is described as blood tinged. The patient states that diarrhea does not awaken her from sleep. Associated symptoms include increased flatus. Pertinent negatives include no abdominal pain, bloating, fever, headaches, URI or vomiting. Drinks 2-3 Pepsi a day and 1 cup of coffee. She does not think she has the best diet. Patient states the diarrhea seemed to start about the same time she was switched to [place]. She sees Dr. Makenzie Sawyer at [facility] for her psychiatric medications. She states [place] is working well for her mood so she is hesitant to try anything different. She has been on Zoloft for quite some time. Review of Systems   Constitutional: Negative for fever. Cardiovascular: Negative for chest pain. Gastrointestinal: Positive for diarrhea, flatus and nausea. Negative for abdominal pain, bloating and vomiting. Neurological: Negative for headaches. Prior to Visit Medications    Medication Sig Taking? Authorizing Provider   eszopiclone (ESZOPICLONE) 3 MG TABS Take 3 mg by mouth nightly.  At hs Yes Historical Provider, MD   sertraline (ZOLOFT) 25 MG tablet Take 25 mg by mouth daily Yes Historical Provider, MD   levonorgestrel-ethinyl estradiol (AVIANE;ALESSE;LESSINA) 0.1-20 MG-MCG per tablet Take 1 tablet by mouth daily Yes Nishant Hernandez DO   lisinopril (PRINIVIL;ZESTRIL) 10 MG tablet Take 1 tablet by mouth daily Yes Nishant Hernandez, DO   EPINEPHrine (EPIPEN 2-WENDY) 0.3 MG/0.3ML SOAJ injection Use as directed for allergic reaction Yes Nishant Hernandez DO   divalproex (DEPAKOTE) 500 MG DR tablet Take 2 tablets by mouth 2 times daily Yes Ignacio KWONG MD   lurasidone (LATUDA) 40 MG TABS tablet Take 1 tablet by mouth Daily with supper  Patient taking differently: Take 80 mg by mouth Daily with supper  Yes Ignacio KWONG MD   propranolol (INDERAL) 20 MG tablet Take 1 tablet by mouth 3 times daily as needed (anxiety) Yes Alexx Alves MD   benztropine (COGENTIN) 0.5 MG tablet TAKE 1 TABLET BY MOUTH ONCE DAILY TO TWICE DAILY AS NEEDED FOR RESTLESSNESS  Historical Provider, MD        Social History     Tobacco Use    Smoking status: Former Smoker    Smokeless tobacco: Never Used    Tobacco comment: quit 2016   Substance Use Topics    Alcohol use: Yes     Comment: pt states last year daily drinker 4-6 drinks/day        Vitals:    01/31/20 1400   BP: 110/80   Site: Right Upper Arm   Position: Sitting   Cuff Size: Large Adult   Pulse: 81   Resp: 12   Temp: 97.6 °F (36.4 °C)   TempSrc: Tympanic   SpO2: 97%   Weight: 236 lb 3.2 oz (107.1 kg)   Height: 5' 3\" (1.6 m)     Estimated body mass index is 41.84 kg/m² as calculated from the following:    Height as of this encounter: 5' 3\" (1.6 m). Weight as of this encounter: 236 lb 3.2 oz (107.1 kg). Physical Exam  Vitals signs and nursing note reviewed. Constitutional:       Appearance: Normal appearance. HENT:      Head: Normocephalic and atraumatic. Nose: Nose normal.   Cardiovascular:      Rate and Rhythm: Normal rate. Pulmonary:      Effort: Pulmonary effort is normal.   Abdominal:      General: Abdomen is flat. Bowel sounds are normal.      Palpations: Abdomen is soft. Genitourinary:      Skin:     General: Skin is warm and dry. Neurological:      General: No focal deficit present. Mental Status: She is alert and oriented to person, place, and time. Mental status is at baseline. Psychiatric:         Mood and Affect: Mood normal.         Behavior: Behavior normal.         Thought Content: Thought content normal.         Judgment: Judgment normal.         ASSESSMENT/PLAN:  1.  External hemorrhoid  - hydrocortisone (ANUSOL-HC) 2.5 % rectal cream; Place rectally 2 times daily. Dispense: 1 Tube; Refill: 1  - Keep skin clean and dry. Use a moisture barrier cream such as Desitin or Pinxalve    2. Diarrhea, unspecified type  - Discuss symptoms with Dr. Rehana Adams as well for her opinion as to if this is a side effect of Latuda  - Patient wishes to defer a referral to general surgery at this time    Return if symptoms worsen or fail to improve. Orders Placed This Encounter   Medications    hydrocortisone (ANUSOL-HC) 2.5 % rectal cream     Sig: Place rectally 2 times daily. Dispense:  1 Tube     Refill:  1     No orders of the defined types were placed in this encounter. Patient given educational materials - see patient instructions. Discussed use, benefit, and side effects of prescribed medications. All patient questions answered. Pt voiced understanding. Reviewed health maintenance. Instructed to continue current medications, diet and exercise.       Electronically signed by MARCELLE Matute CNP on 1/31/2020 at 3:45 PM

## 2020-03-30 ENCOUNTER — HOSPITAL ENCOUNTER (OUTPATIENT)
Age: 30
Setting detail: SPECIMEN
Discharge: HOME | End: 2020-03-30
Payer: COMMERCIAL

## 2020-03-30 ENCOUNTER — OFFICE VISIT (OUTPATIENT)
Dept: FAMILY MEDICINE CLINIC | Age: 30
End: 2020-03-30
Payer: COMMERCIAL

## 2020-03-30 ENCOUNTER — HOSPITAL ENCOUNTER (OUTPATIENT)
Dept: GENERAL RADIOLOGY | Age: 30
Discharge: HOME | End: 2020-04-01
Payer: COMMERCIAL

## 2020-03-30 VITALS
HEART RATE: 85 BPM | BODY MASS INDEX: 44.72 KG/M2 | OXYGEN SATURATION: 98 % | SYSTOLIC BLOOD PRESSURE: 110 MMHG | WEIGHT: 243 LBS | DIASTOLIC BLOOD PRESSURE: 62 MMHG | HEIGHT: 62 IN | TEMPERATURE: 98.4 F

## 2020-03-30 LAB — HCG(URINE) PREGNANCY TEST: NEGATIVE

## 2020-03-30 PROCEDURE — 99214 OFFICE O/P EST MOD 30 MIN: CPT | Performed by: FAMILY MEDICINE

## 2020-03-30 PROCEDURE — 74018 RADEX ABDOMEN 1 VIEW: CPT

## 2020-03-30 PROCEDURE — 81025 URINE PREGNANCY TEST: CPT

## 2020-03-30 RX ORDER — CLOTRIMAZOLE 1 %
CREAM (GRAM) TOPICAL
Qty: 1 TUBE | Refills: 0 | Status: SHIPPED | OUTPATIENT
Start: 2020-03-30 | End: 2020-04-06

## 2020-03-30 RX ORDER — LISINOPRIL 10 MG/1
10 TABLET ORAL DAILY
Qty: 90 TABLET | Refills: 3 | Status: SHIPPED | OUTPATIENT
Start: 2020-03-30 | End: 2021-06-08

## 2020-03-30 RX ORDER — LEVONORGESTREL AND ETHINYL ESTRADIOL 0.1-0.02MG
1 KIT ORAL DAILY
Qty: 3 PACKET | Refills: 1 | Status: SHIPPED | OUTPATIENT
Start: 2020-03-30 | End: 2020-09-01 | Stop reason: SDUPTHER

## 2020-03-30 ASSESSMENT — ENCOUNTER SYMPTOMS
ABDOMINAL DISTENTION: 1
DIARRHEA: 0
ABDOMINAL PAIN: 1
NAUSEA: 0
VOMITING: 0

## 2020-03-30 NOTE — PROGRESS NOTES
(AVIANE;ALESSE;LESSINA) 0.1-20 MG-MCG per tablet Take 1 tablet by mouth daily 3 packet 1    lurasidone (LATUDA) 120 MG tablet Take 120 mg by mouth daily      lisinopril (PRINIVIL;ZESTRIL) 10 MG tablet Take 1 tablet by mouth daily 90 tablet 3    eszopiclone (ESZOPICLONE) 3 MG TABS Take 3 mg by mouth nightly. At hs      sertraline (ZOLOFT) 25 MG tablet Take 25 mg by mouth daily      EPINEPHrine (EPIPEN 2-WENDY) 0.3 MG/0.3ML SOAJ injection Use as directed for allergic reaction 1 each 1    divalproex (DEPAKOTE) 500 MG DR tablet Take 2 tablets by mouth 2 times daily 120 tablet 0    propranolol (INDERAL) 20 MG tablet Take 1 tablet by mouth 3 times daily as needed (anxiety) 90 tablet 0     No current facility-administered medications for this visit. Allergies   Allergen Reactions    Bee Venom Anaphylaxis       Health Maintenance   Topic Date Due    Varicella vaccine (1 of 2 - 2-dose childhood series) 09/04/2020 (Originally 2/19/1991)    HIV screen  09/04/2020 (Originally 2/19/2006)    Flu vaccine (Season Ended) 11/06/2020 (Originally 9/1/2020)    Potassium monitoring  08/23/2020    Creatinine monitoring  08/23/2020    Cervical cancer screen  11/06/2022    DTaP/Tdap/Td vaccine (2 - Td) 07/28/2026    Hepatitis A vaccine  Aged Out    Hepatitis B vaccine  Aged Out    Hib vaccine  Aged Out    Meningococcal (ACWY) vaccine  Aged Out    Pneumococcal 0-64 years Vaccine  Aged Out       Subjective:      Review of Systems   Constitutional: Positive for chills (2 days ago). Negative for fever (Tmax 99.4). Gastrointestinal: Positive for abdominal distention (improved with bowel movements temporarily) and abdominal pain. Negative for diarrhea, nausea and vomiting. Blood in stool: black and tarry. Genitourinary: Negative for dysuria, frequency and hematuria. Menstrual problem: LMP ~3/15/20.        Objective:     Vitals:    03/30/20 1459   BP: 110/62   Pulse: 85   Temp: 98.4 °F (36.9 °C)   SpO2: 98%   Weight: 243

## 2020-04-30 RX ORDER — LISINOPRIL 10 MG/1
TABLET ORAL
Qty: 30 TABLET | Refills: 0 | OUTPATIENT
Start: 2020-04-30

## 2020-04-30 NOTE — TELEPHONE ENCOUNTER
Heavenly Goodson is requesting a refill on the following medication(s):  Requested Prescriptions     Pending Prescriptions Disp Refills    lisinopril (PRINIVIL;ZESTRIL) 10 MG tablet [Pharmacy Med Name: Lisinopril 10 MG Oral Tablet] 30 tablet 0     Sig: Take 1 tablet by mouth once daily       Last Visit Date (If Applicable):  2/74/8713    Next Visit Date:    Visit date not found

## 2020-09-01 ENCOUNTER — HOSPITAL ENCOUNTER (OUTPATIENT)
Dept: LAB | Age: 30
Discharge: HOME | End: 2020-09-01
Payer: COMMERCIAL

## 2020-09-01 ENCOUNTER — OFFICE VISIT (OUTPATIENT)
Dept: FAMILY MEDICINE CLINIC | Age: 30
End: 2020-09-01
Payer: COMMERCIAL

## 2020-09-01 VITALS
SYSTOLIC BLOOD PRESSURE: 110 MMHG | TEMPERATURE: 98.2 F | WEIGHT: 233.2 LBS | BODY MASS INDEX: 42.91 KG/M2 | DIASTOLIC BLOOD PRESSURE: 80 MMHG | HEIGHT: 62 IN | OXYGEN SATURATION: 97 % | HEART RATE: 81 BPM

## 2020-09-01 PROBLEM — F31.11 BIPOLAR AFFECTIVE DISORDER, CURRENTLY MANIC, MILD (HCC): Status: ACTIVE | Noted: 2020-09-01

## 2020-09-01 PROBLEM — F43.10 POSTTRAUMATIC STRESS DISORDER: Status: ACTIVE | Noted: 2020-09-01

## 2020-09-01 PROBLEM — F41.0 PANIC DISORDER: Status: ACTIVE | Noted: 2020-09-01

## 2020-09-01 PROBLEM — F10.11 NONDEPENDENT ALCOHOL ABUSE, IN REMISSION: Status: ACTIVE | Noted: 2020-09-01

## 2020-09-01 PROBLEM — F31.10 MANIC BIPOLAR I DISORDER (HCC): Status: ACTIVE | Noted: 2019-08-22

## 2020-09-01 PROBLEM — F31.62 MIXED BIPOLAR AFFECTIVE DISORDER, MODERATE (HCC): Status: ACTIVE | Noted: 2020-09-01

## 2020-09-01 PROBLEM — N92.6 MENSTRUAL DISORDER: Status: ACTIVE | Noted: 2020-09-01

## 2020-09-01 LAB
ABSOLUTE EOS #: 0.17 K/UL (ref 0–0.44)
ABSOLUTE IMMATURE GRANULOCYTE: 0.14 K/UL (ref 0–0.3)
ABSOLUTE LYMPH #: 4.22 K/UL (ref 1.1–3.7)
ABSOLUTE MONO #: 0.73 K/UL (ref 0.1–1.2)
ALBUMIN SERPL-MCNC: 3.9 G/DL (ref 3.5–5.2)
ALBUMIN/GLOBULIN RATIO: 1.4 (ref 1–2.5)
ALP BLD-CCNC: 66 U/L (ref 35–104)
ALT SERPL-CCNC: 44 U/L (ref 5–33)
ANION GAP SERPL CALCULATED.3IONS-SCNC: 11 MMOL/L (ref 9–17)
AST SERPL-CCNC: 26 U/L
BASOPHILS # BLD: 1 % (ref 0–2)
BASOPHILS ABSOLUTE: 0.05 K/UL (ref 0–0.2)
BILIRUB SERPL-MCNC: 0.14 MG/DL (ref 0.3–1.2)
BILIRUBIN DIRECT: 0.09 MG/DL
BILIRUBIN, INDIRECT: 0.05 MG/DL (ref 0–1)
BUN BLDV-MCNC: 11 MG/DL (ref 6–20)
BUN/CREAT BLD: 18 (ref 9–20)
CALCIUM SERPL-MCNC: 9.1 MG/DL (ref 8.6–10.4)
CHLORIDE BLD-SCNC: 101 MMOL/L (ref 98–107)
CHOLESTEROL/HDL RATIO: 5.9
CHOLESTEROL/HDL RATIO: 5.9
CHOLESTEROL: 190 MG/DL
CHOLESTEROL: 190 MG/DL
CO2: 27 MMOL/L (ref 20–31)
CREAT SERPL-MCNC: 0.61 MG/DL (ref 0.5–0.9)
DIFFERENTIAL TYPE: ABNORMAL
EOSINOPHILS RELATIVE PERCENT: 2 % (ref 1–4)
GFR AFRICAN AMERICAN: >60 ML/MIN
GFR NON-AFRICAN AMERICAN: >60 ML/MIN
GFR SERPL CREATININE-BSD FRML MDRD: ABNORMAL ML/MIN/{1.73_M2}
GFR SERPL CREATININE-BSD FRML MDRD: ABNORMAL ML/MIN/{1.73_M2}
GLOBULIN: 2.8 G/DL (ref 1.5–3.8)
GLUCOSE BLD-MCNC: 105 MG/DL (ref 70–99)
HCT VFR BLD CALC: 39.6 % (ref 36.3–47.1)
HDLC SERPL-MCNC: 32 MG/DL
HDLC SERPL-MCNC: 32 MG/DL
HEMOGLOBIN: 12.8 G/DL (ref 11.9–15.1)
IMMATURE GRANULOCYTES: 2 %
LDL CHOLESTEROL: 101 MG/DL (ref 0–130)
LDL CHOLESTEROL: 101 MG/DL (ref 0–130)
LYMPHOCYTES # BLD: 47 % (ref 24–43)
MCH RBC QN AUTO: 30.7 PG (ref 25.2–33.5)
MCHC RBC AUTO-ENTMCNC: 32.3 G/DL (ref 25.2–33.5)
MCV RBC AUTO: 95 FL (ref 82.6–102.9)
MONOCYTES # BLD: 8 % (ref 3–12)
NRBC AUTOMATED: 0 PER 100 WBC
PDW BLD-RTO: 12.4 % (ref 11.8–14.4)
PLATELET # BLD: 260 K/UL (ref 138–453)
PLATELET ESTIMATE: ABNORMAL
PMV BLD AUTO: 9 FL (ref 8.1–13.5)
POTASSIUM SERPL-SCNC: 4.3 MMOL/L (ref 3.7–5.3)
RBC # BLD: 4.17 M/UL (ref 3.95–5.11)
RBC # BLD: ABNORMAL 10*6/UL
SEG NEUTROPHILS: 40 % (ref 36–65)
SEGMENTED NEUTROPHILS ABSOLUTE COUNT: 3.56 K/UL (ref 1.5–8.1)
SODIUM BLD-SCNC: 139 MMOL/L (ref 135–144)
TOTAL PROTEIN: 6.7 G/DL (ref 6.4–8.3)
TRIGL SERPL-MCNC: 285 MG/DL
TRIGL SERPL-MCNC: 285 MG/DL
VALPROIC ACID LEVEL: 44 UG/ML (ref 50–125)
VALPROIC DATE LAST DOSE: 8
VALPROIC DOSE AMOUNT: 1000
VALPROIC TIME LAST DOSE: 2000
VLDLC SERPL CALC-MCNC: ABNORMAL MG/DL (ref 1–30)
VLDLC SERPL CALC-MCNC: ABNORMAL MG/DL (ref 1–30)
WBC # BLD: 8.9 K/UL (ref 3.5–11.3)
WBC # BLD: ABNORMAL 10*3/UL

## 2020-09-01 PROCEDURE — 80076 HEPATIC FUNCTION PANEL: CPT

## 2020-09-01 PROCEDURE — 36415 COLL VENOUS BLD VENIPUNCTURE: CPT

## 2020-09-01 PROCEDURE — 80061 LIPID PANEL: CPT

## 2020-09-01 PROCEDURE — 85025 COMPLETE CBC W/AUTO DIFF WBC: CPT

## 2020-09-01 PROCEDURE — 99214 OFFICE O/P EST MOD 30 MIN: CPT | Performed by: FAMILY MEDICINE

## 2020-09-01 PROCEDURE — 80164 ASSAY DIPROPYLACETIC ACD TOT: CPT

## 2020-09-01 PROCEDURE — 80048 BASIC METABOLIC PNL TOTAL CA: CPT

## 2020-09-01 RX ORDER — ESZOPICLONE 2 MG/1
1 TABLET, FILM COATED ORAL NIGHTLY
COMMUNITY
Start: 2020-08-07

## 2020-09-01 RX ORDER — LEVONORGESTREL AND ETHINYL ESTRADIOL 0.1-0.02MG
1 KIT ORAL DAILY
Qty: 3 PACKET | Refills: 3 | Status: SHIPPED | OUTPATIENT
Start: 2020-09-01

## 2020-09-01 RX ORDER — EPINEPHRINE 0.3 MG/.3ML
INJECTION SUBCUTANEOUS
Qty: 1 EACH | Refills: 1 | Status: SHIPPED | OUTPATIENT
Start: 2020-09-01

## 2020-09-01 ASSESSMENT — PATIENT HEALTH QUESTIONNAIRE - PHQ9
SUM OF ALL RESPONSES TO PHQ9 QUESTIONS 1 & 2: 0
SUM OF ALL RESPONSES TO PHQ QUESTIONS 1-9: 0
1. LITTLE INTEREST OR PLEASURE IN DOING THINGS: 0
SUM OF ALL RESPONSES TO PHQ QUESTIONS 1-9: 0
2. FEELING DOWN, DEPRESSED OR HOPELESS: 0

## 2020-09-01 ASSESSMENT — ENCOUNTER SYMPTOMS
ABDOMINAL PAIN: 1
BLOOD IN STOOL: 0
COUGH: 0
SHORTNESS OF BREATH: 0
DIARRHEA: 0

## 2020-09-01 NOTE — PROGRESS NOTES
ALISSON Parikh [address]  [address], [address] [place]  [phone]      Khoa Henderson is a 35 y.o. female who presents today for her medical conditions/complaints as noted below. Khoa Henderson is c/o of Abdominal Pain (had just gotten over a really bad intestional bug - and just this past Wednesday she got the same type of pain so she would like to discuss it with you)      HPI:     Pt here today for follow-up of abdominal pain. Last seen on 3/30/20 for b/l lower quadrant abdominal pain - had KUB that day, which was negative. States that symptoms resolved on their a few days later. Had been resolved completely until 6 days ago when pain returned. This time, she had stabbing pain, usually over RLQ, but sometimes over whole entire lower abdomen. Had nausea, vomiting, diarrhea, body aches, fatigue, and fever/chills with it - Tmax 100. Lasted 3-4 days; has felt better x past 3 days. Was using Midol for pain/bloating, Ibuprofen, Gas-X, and Dramamine for the nausea; ate bland food and increased her fluids. Still feels a little tenderness to touch over RLQ, but no pain without pressure being applied. All other symptoms completely resolved. Unsure if she was exposed to someone with stomach virus. Thought maybe it was from eating spicy food earlier in the day, but then she developed fever/chills, etc.    LMP 8/25 - was on period while symptoms occurred last week. Had also had an anxiety attack earlier that day, which she had not had in awhile. Taking OCP's daily for dysmenorrhea and contraception - stable; remembering to take every day. Taking Lisinopril 10 mg daily for HTN - stable. BP well-controlled today - 110/80. Has lost 10 lbs since her last OV in 3/2020 - has been eating more fresh produce, home cooked meals, etc.  Also going for walks and bike rides with her mom for exercise 3 days/week. Would like to lose 60 lbs total in the next year.     Reviewed lab results from this morning. Past Medical History:   Diagnosis Date    Anxiety     Bipolar disease, manic ([address]     History of chicken pox     Hypertension     Panic disorder 10/2017    Dx by Dr. Yary Rojo from [facility]      History reviewed. No pertinent surgical history. Family History   Problem Relation Age of Onset    Depression Mother     High Blood Pressure Father     Stroke Paternal Grandmother     Breast Cancer Maternal Grandmother      Social History     Tobacco Use    Smoking status: Former Smoker    Smokeless tobacco: Never Used    Tobacco comment: quit 2016   Substance Use Topics    Alcohol use: Yes     Comment: pt states last year daily drinker 4-6 drinks/day      Current Outpatient Medications   Medication Sig Dispense Refill    eszopiclone (LUNESTA) 2 MG TABS Take 1 tablet by mouth nightly.  levonorgestrel-ethinyl estradiol (AVIANE;ALESSE;LESSINA) 0.1-20 MG-MCG per tablet Take 1 tablet by mouth daily 3 packet 3    EPINEPHrine (EPIPEN 2-WENDY) 0.3 MG/0.3ML SOAJ injection Use as directed for allergic reaction 1 each 1    lurasidone (LATUDA) 120 MG tablet Take 120 mg by mouth daily      lisinopril (PRINIVIL;ZESTRIL) 10 MG tablet Take 1 tablet by mouth daily 90 tablet 3    sertraline (ZOLOFT) 25 MG tablet Take 25 mg by mouth daily      divalproex (DEPAKOTE) 500 MG DR tablet Take 2 tablets by mouth 2 times daily 120 tablet 0    propranolol (INDERAL) 20 MG tablet Take 1 tablet by mouth 3 times daily as needed (anxiety) 90 tablet 0     No current facility-administered medications for this visit.       Allergies   Allergen Reactions    Bee Venom Anaphylaxis       Health Maintenance   Topic Date Due    Varicella vaccine (1 of 2 - 2-dose childhood series) 02/19/1992    Pneumococcal 0-64 years Vaccine (1 of 1 - PPSV23) 02/19/1997    HIV screen  02/19/2006    Flu vaccine (1) 09/01/2020    Potassium monitoring  09/01/2021    Creatinine monitoring  09/01/2021    Cervical cancer screen  11/06/2022    DTaP/Tdap/Td vaccine (2 - Td) 07/28/2026    Hepatitis A vaccine  Aged Out    Hepatitis B vaccine  Aged Out    Hib vaccine  Aged Out    Meningococcal (ACWY) vaccine  Aged Out       Subjective:      Review of Systems   Constitutional: Fever: no fever x past 3 days. Respiratory: Negative for cough and shortness of breath. Gastrointestinal: Positive for abdominal pain. Negative for blood in stool and diarrhea. Genitourinary: Negative for dysuria and hematuria. Skin: Negative for rash and wound. Neurological: Negative for dizziness and light-headedness. Psychiatric/Behavioral: Negative for suicidal ideas. Objective:     Vitals:    09/01/20 1330   BP: 110/80   Pulse: 81   Temp: 98.2 °F (36.8 °C)   TempSrc: Tympanic   SpO2: 97%   Weight: 233 lb 3.2 oz (105.8 kg)   Height: 5' 2\" (1.575 m)     Physical Exam  Vitals signs and nursing note reviewed. Constitutional:       General: She is not in acute distress. Appearance: She is well-developed. HENT:      Head: Normocephalic and atraumatic. Right Ear: Tympanic membrane, ear canal and external ear normal.      Left Ear: Tympanic membrane, ear canal and external ear normal.      Nose: Nose normal.      Mouth/Throat:      Mouth: Mucous membranes are moist.      Pharynx: Oropharynx is clear. No posterior oropharyngeal erythema. Eyes:      Conjunctiva/sclera: Conjunctivae normal.   Neck:      Musculoskeletal: Neck supple. Cardiovascular:      Rate and Rhythm: Normal rate and regular rhythm. Heart sounds: Normal heart sounds. Pulmonary:      Effort: Pulmonary effort is normal. No respiratory distress. Breath sounds: Normal breath sounds. Abdominal:      General: Bowel sounds are normal. There is no distension. Palpations: Abdomen is soft. Tenderness: There is abdominal tenderness (RLQ - mild). There is no guarding. Skin:     General: Skin is warm and dry.    Neurological:      Mental Status: She is alert and oriented to person, place, and time. Assessment:       Diagnosis Orders   1. RLQ abdominal pain     2. Essential hypertension     3. Dysmenorrhea  levonorgestrel-ethinyl estradiol (AVIANE;ALESSE;LESSINA) 0.1-20 MG-MCG per tablet   4. Use of medication with teratogenic potential in female of reproductive age  levonorgestrel-ethinyl estradiol (AVIANE;ALESSE;LESSINA) 0.1-20 MG-MCG per tablet   5. Hypertriglyceridemia     6. Allergy to bee sting  EPINEPHrine (EPIPEN 2-WENDY) 0.3 MG/0.3ML SOAJ injection         Plan:      Return in about 2 months (around 11/16/2020) for well woman exam.    No orders of the defined types were placed in this encounter. Orders Placed This Encounter   Medications    levonorgestrel-ethinyl estradiol (AVIANE;ALESSE;LESSINA) 0.1-20 MG-MCG per tablet     Sig: Take 1 tablet by mouth daily     Dispense:  3 packet     Refill:  3    EPINEPHrine (EPIPEN 2-WENDY) 0.3 MG/0.3ML SOAJ injection     Sig: Use as directed for allergic reaction     Dispense:  1 each     Refill:  1       Patient given educational materials - see patient instructions. Discussed use, benefit, and side effects of prescribed medications. All patient questions answered. Pt voiced understanding. Reviewed health maintenance.             Electronically signed by Jean Weiss DO on 9/8/2020 at 8:07 AM

## 2020-12-10 ENCOUNTER — OFFICE VISIT (OUTPATIENT)
Dept: PRIMARY CARE CLINIC | Age: 30
End: 2020-12-10
Payer: COMMERCIAL

## 2020-12-10 ENCOUNTER — HOSPITAL ENCOUNTER (OUTPATIENT)
Age: 30
Setting detail: SPECIMEN
Discharge: HOME | End: 2020-12-10
Payer: COMMERCIAL

## 2020-12-10 VITALS
OXYGEN SATURATION: 96 % | RESPIRATION RATE: 16 BRPM | WEIGHT: 240.2 LBS | SYSTOLIC BLOOD PRESSURE: 115 MMHG | HEIGHT: 62 IN | BODY MASS INDEX: 44.2 KG/M2 | DIASTOLIC BLOOD PRESSURE: 80 MMHG | HEART RATE: 90 BPM | TEMPERATURE: 98.7 F

## 2020-12-10 PROCEDURE — 99213 OFFICE O/P EST LOW 20 MIN: CPT | Performed by: PHYSICIAN ASSISTANT

## 2020-12-10 PROCEDURE — U0003 INFECTIOUS AGENT DETECTION BY NUCLEIC ACID (DNA OR RNA); SEVERE ACUTE RESPIRATORY SYNDROME CORONAVIRUS 2 (SARS-COV-2) (CORONAVIRUS DISEASE [COVID-19]), AMPLIFIED PROBE TECHNIQUE, MAKING USE OF HIGH THROUGHPUT TECHNOLOGIES AS DESCRIBED BY CMS-2020-01-R: HCPCS

## 2020-12-10 ASSESSMENT — ENCOUNTER SYMPTOMS
SORE THROAT: 1
WHEEZING: 0
VOMITING: 0
NAUSEA: 0
RHINORRHEA: 1
COUGH: 1
TROUBLE SWALLOWING: 1
CHEST TIGHTNESS: 1
DIARRHEA: 1
SHORTNESS OF BREATH: 1

## 2020-12-10 ASSESSMENT — PATIENT HEALTH QUESTIONNAIRE - PHQ9
SUM OF ALL RESPONSES TO PHQ QUESTIONS 1-9: 0
SUM OF ALL RESPONSES TO PHQ QUESTIONS 1-9: 0
2. FEELING DOWN, DEPRESSED OR HOPELESS: 0
SUM OF ALL RESPONSES TO PHQ QUESTIONS 1-9: 0
1. LITTLE INTEREST OR PLEASURE IN DOING THINGS: 0
SUM OF ALL RESPONSES TO PHQ9 QUESTIONS 1 & 2: 0

## 2020-12-10 NOTE — PROGRESS NOTES
[facility]    Subjective:      Patient ID: Natasha Gamez is a 35 y.o. y.o. female. Patient is seen due to sob today. She felt under weather the past few days. They had a friend come over 3 days ago that was sick and last week her 's coworker tested positive for covid and he is ill too. She has had chills no fever. Had some sweats the past few nights. No cough. Taking motrin for HA and aches. No flu shot. Past Medical History:   Diagnosis Date    Anxiety     Bipolar disease, manic ([address]     History of chicken pox     Hypertension     Panic disorder 10/2017    Dx by Dr. Ruben Booker from [facility]       No past surgical history on file. Family History   Problem Relation Age of Onset    Depression Mother     High Blood Pressure Father     Stroke Paternal Grandmother     Breast Cancer Maternal Grandmother        Allergies   Allergen Reactions    Bee Venom Anaphylaxis       Current Outpatient Medications   Medication Sig Dispense Refill    eszopiclone (LUNESTA) 2 MG TABS Take 1 tablet by mouth nightly.  levonorgestrel-ethinyl estradiol (AVIANE;ALESSE;LESSINA) 0.1-20 MG-MCG per tablet Take 1 tablet by mouth daily 3 packet 3    EPINEPHrine (EPIPEN 2-WENDY) 0.3 MG/0.3ML SOAJ injection Use as directed for allergic reaction 1 each 1    lurasidone (LATUDA) 120 MG tablet Take 120 mg by mouth daily      lisinopril (PRINIVIL;ZESTRIL) 10 MG tablet Take 1 tablet by mouth daily 90 tablet 3    sertraline (ZOLOFT) 25 MG tablet Take 25 mg by mouth daily      divalproex (DEPAKOTE) 500 MG DR tablet Take 2 tablets by mouth 2 times daily 120 tablet 0    propranolol (INDERAL) 20 MG tablet Take 1 tablet by mouth 3 times daily as needed (anxiety) (Patient not taking: Reported on 12/10/2020) 90 tablet 0     No current facility-administered medications for this visit. Review of Systems   Constitutional: Positive for chills, diaphoresis and fatigue.  Negative for appetite change and fever.   HENT: Positive for postnasal drip, rhinorrhea, sneezing, sore throat and trouble swallowing. Negative for congestion. Respiratory: Positive for cough, chest tightness and shortness of breath. Negative for wheezing. Cardiovascular: Positive for chest pain. Negative for palpitations. Chest burns when she breathes. Gastrointestinal: Positive for diarrhea. Negative for nausea and vomiting. Stools are loose and loose and watery. Genitourinary: Negative for difficulty urinating. Musculoskeletal: Positive for myalgias. Skin: Negative for rash. Neurological: Positive for dizziness, light-headedness and headaches. Negative for numbness. Psychiatric/Behavioral: Positive for sleep disturbance. The patient is not nervous/anxious. Objective:      /80 (Site: Right Upper Arm, Position: Sitting, Cuff Size: Large Adult)   Pulse 90   Temp 98.7 °F (37.1 °C) (Temporal)   Resp 16   Ht 5' 2\" (1.575 m)   Wt 240 lb 3.2 oz (109 kg)   SpO2 96%   BMI 43.93 kg/m²     Physical Exam  Vitals signs and nursing note reviewed. Constitutional:       General: She is not in acute distress. Appearance: She is well-developed. She is obese. She is ill-appearing. HENT:      Head: Normocephalic and atraumatic. Right Ear: Tympanic membrane, ear canal and external ear normal.      Left Ear: Tympanic membrane, ear canal and external ear normal.      Nose: Congestion present. No rhinorrhea. Mouth/Throat:      Mouth: Mucous membranes are moist.      Pharynx: No oropharyngeal exudate or posterior oropharyngeal erythema. Eyes:      General: No scleral icterus. Conjunctiva/sclera: Conjunctivae normal.   Neck:      Musculoskeletal: Normal range of motion and neck supple. No neck rigidity or muscular tenderness. Cardiovascular:      Rate and Rhythm: Normal rate and regular rhythm. Heart sounds: Normal heart sounds. No murmur. No gallop.     Pulmonary:      Effort: Pulmonary effort is normal. No respiratory distress. Breath sounds: Normal breath sounds. No wheezing, rhonchi or rales. Abdominal:      General: There is no distension. Palpations: Abdomen is soft. There is no mass. Tenderness: There is no abdominal tenderness. There is no guarding or rebound. Comments: BS diminished in general.    Musculoskeletal:         General: No tenderness, deformity or signs of injury. Right lower leg: No edema. Left lower leg: No edema. Lymphadenopathy:      Cervical: No cervical adenopathy. Skin:     General: Skin is warm and dry. Findings: No erythema or rash. Neurological:      General: No focal deficit present. Mental Status: She is alert and oriented to person, place, and time. Sensory: No sensory deficit. Gait: Gait normal.   Psychiatric:         Mood and Affect: Mood normal.         Behavior: Behavior normal.           Assessment & Plan:     1. SOB (shortness of breath)    - COVID-19 Ambulatory; Future    2. Viral illness    - COVID-19 Ambulatory;  Future       fluids rest   OTC medications prn  Answered questions  Did not feel chest xray was necessary at this point  Discussed quarantine with patient   To return or call urgent care if anything changes  Will be notified of all results    LISSA Carlos  12/10/2020 2:59 PM EST    (Pleasenote that portions of this note were completed with a voice recognition program.Efforts were made to edit the dictations but occasionally words are mis-transcribed.)

## 2020-12-13 LAB — SARS-COV-2, NAA: NOT DETECTED

## 2021-06-08 DIAGNOSIS — I10 ESSENTIAL HYPERTENSION: ICD-10-CM

## 2021-06-10 RX ORDER — LISINOPRIL 10 MG/1
TABLET ORAL
Qty: 90 TABLET | Refills: 1 | Status: SHIPPED | OUTPATIENT
Start: 2021-06-10 | End: 2021-09-24 | Stop reason: ALTCHOICE

## 2021-06-30 LAB
BASOPHILS ABSOLUTE: 0.03 /ΜL
BASOPHILS RELATIVE PERCENT: 0.3 %
BUN BLDV-MCNC: 11 MG/DL
CALCIUM SERPL-MCNC: 8.5 MG/DL
CHLORIDE BLD-SCNC: 103 MMOL/L
CO2: 23 MMOL/L
CREAT SERPL-MCNC: 0.87 MG/DL
EOSINOPHILS ABSOLUTE: 0.11 /ΜL
EOSINOPHILS RELATIVE PERCENT: 1.1 %
GFR CALCULATED: >60
GLUCOSE BLD-MCNC: 125 MG/DL
HCT VFR BLD CALC: 38.7 % (ref 36–46)
HEMOGLOBIN: 12.8 G/DL (ref 12–16)
LYMPHOCYTES ABSOLUTE: 3.13 /ΜL
LYMPHOCYTES RELATIVE PERCENT: 31.1 %
MCH RBC QN AUTO: 30.3 PG
MCHC RBC AUTO-ENTMCNC: 33.1 G/DL
MCV RBC AUTO: 91.5 FL
MONOCYTES ABSOLUTE: 0.72 /ΜL
MONOCYTES RELATIVE PERCENT: 7.1 %
NEUTROPHILS ABSOLUTE: 6.09 /ΜL
NEUTROPHILS RELATIVE PERCENT: 80.4 %
PDW BLD-RTO: NORMAL %
PLATELET # BLD: 209 K/ΜL
PMV BLD AUTO: 9.7 FL
POTASSIUM SERPL-SCNC: 3.8 MMOL/L
RBC # BLD: 4.23 10^6/ΜL
SODIUM BLD-SCNC: 139 MMOL/L
WBC # BLD: 10.1 10^3/ML

## 2021-09-24 ENCOUNTER — OFFICE VISIT (OUTPATIENT)
Dept: FAMILY MEDICINE CLINIC | Age: 31
End: 2021-09-24
Payer: COMMERCIAL

## 2021-09-24 VITALS
HEART RATE: 88 BPM | OXYGEN SATURATION: 97 % | TEMPERATURE: 98.6 F | DIASTOLIC BLOOD PRESSURE: 72 MMHG | HEIGHT: 62 IN | SYSTOLIC BLOOD PRESSURE: 114 MMHG | BODY MASS INDEX: 45.9 KG/M2 | WEIGHT: 249.4 LBS

## 2021-09-24 DIAGNOSIS — F31.62 MIXED BIPOLAR AFFECTIVE DISORDER, MODERATE (HCC): ICD-10-CM

## 2021-09-24 DIAGNOSIS — N94.6 DYSMENORRHEA: ICD-10-CM

## 2021-09-24 DIAGNOSIS — E04.1 THYROID NODULE: ICD-10-CM

## 2021-09-24 DIAGNOSIS — I10 ESSENTIAL HYPERTENSION: Primary | ICD-10-CM

## 2021-09-24 PROCEDURE — 99214 OFFICE O/P EST MOD 30 MIN: CPT | Performed by: FAMILY MEDICINE

## 2021-09-24 RX ORDER — LITHIUM CARBONATE 300 MG/1
600 TABLET, FILM COATED, EXTENDED RELEASE ORAL EVERY 12 HOURS SCHEDULED
COMMUNITY
Start: 2021-09-17

## 2021-09-24 RX ORDER — HYDROXYZINE HYDROCHLORIDE 25 MG/1
25 TABLET, FILM COATED ORAL 3 TIMES DAILY PRN
COMMUNITY
Start: 2021-09-17

## 2021-09-24 RX ORDER — ASENAPINE 10 MG/1
15 TABLET SUBLINGUAL NIGHTLY
COMMUNITY
Start: 2021-09-17

## 2021-09-24 NOTE — PROGRESS NOTES
ALIS[facility] Parikh [address]  [address], [address] [place]  [phone]      Megan Linares is a 28 y.o. female who presents today for her medical conditions/complaints as noted below. Megan Linares is c/o of Hypertension and Discuss Medications (birth control shot/ needs meds switched)      HPI:     Pt here today for follow-up of HTN and mood. Was just discharged home from [facility] on 9/18 after 9-day admission for SI/mood. Pt had been having more suicidal thoughts and \"mixed\" episodes of rapid changes between depression and yue; had racing thoughts. This had been occurring for 3-4 days prior to going to [facility].  Was seen by Dr. Milton Graham - started on Lithium 600 mg BID, and will be increasing to 900 mg BID in 3 days from now per Dr. Duvall (outpatient Psych) to titrate up dose. Dr. Jose Carlos Delacruz also increased pt's Saphris to 15 mg nightly (from 10 mg) and Hydroxyzine to 50 mg (from 25 mg) TID as needed, as she is currently still in manic episode. Dr. Milton Graham also stopped her Latuda and Depakote. Still taking Zoloft 25 mg daily and Lunesta 2 mg nightly. Last appt with Dr. Jose Carlos Delacruz was yesterday; will see her NP Sonya Murillo) next week and Dr. Jose Carlos Delacruz again in 3 weeks. Also sees Tianna Colon (private practice in [place], [place]) as a counselor once weekly. No longer feeling suicidal or hopeless, but she is getting some episodes of confusion, which Dr. Jose Carlos Delacruz feels it may be due to her current yue or side effects to new meds. She sometimes has trouble with vision/focus and sometimes \"zones out\". When she is really \"elated\", she gets more anxious and she also has urge to spend a lot of money. Has been sober x past 2.5 weeks - going to [facility] meetings in the mornings and calls her sponsor daily. Has been doing AA for approx 3 months.   Had a relapse of substance abuse right before she went in the hospital.  Also has good support system with her , parents, sponsor and counselor. While she was admitted, she was having high BP's, so she was started on Lopressor 25 mg BID on approx day 3. This seemed to improve her BP; however, she did not go home on Rx for this, so has not been taking x past 6 days. Has been checking BP on home cuff at meals:  9/18: 127/84, 131/99  9/19: 137/100, 153/109, 120/100  9/20: 136/100, 140/106, 124/87  9/21: 141/97, 140/101, 130/96  9/22: 151/104    Pt admits that she is really bad at taking OCP's - states she has not been on it for approx 6 months. She is interested in going on Depo injection when her mood is a little more stable. She has been having heavy periods with bad cramping recently; sometimes cannot go anywhere when she's on her period. Takes Midol and hot bath/using heating pad. Past Medical History:   Diagnosis Date    Anxiety     Bipolar disease, manic ([address]     History of chicken pox     Hypertension     Panic disorder 10/2017    Dx by Dr. Patience Bueno from [facility]      History reviewed. No pertinent surgical history. Family History   Problem Relation Age of Onset    Depression Mother     High Blood Pressure Father     Stroke Paternal Grandmother     Breast Cancer Maternal Grandmother      Social History     Tobacco Use    Smoking status: Former Smoker    Smokeless tobacco: Never Used    Tobacco comment: quit 2016   Substance Use Topics    Alcohol use: Yes     Comment: pt states last year daily drinker 4-6 drinks/day      Current Outpatient Medications   Medication Sig Dispense Refill    lithium (LITHOBID) 300 MG extended release tablet Take 600 mg by mouth every 12 hours      hydrOXYzine (ATARAX) 25 MG tablet Take 25 mg by mouth 3 times daily as needed      asenapine maleate (SAPHRIS) 10 MG SUBL sublingual tablet Place 15 mg under the tongue nightly      metoprolol tartrate (LOPRESSOR) 25 MG tablet Take 1 tab by mouth BID.  If BP still elevated after 7 days, increase to 2 tabs BID. 120 tablet 1    eszopiclone (LUNESTA) 2 MG TABS Take 1 tablet by mouth nightly.  levonorgestrel-ethinyl estradiol (AVIANE;ALESSE;LESSINA) 0.1-20 MG-MCG per tablet Take 1 tablet by mouth daily 3 packet 3    EPINEPHrine (EPIPEN 2-WENDY) 0.3 MG/0.3ML SOAJ injection Use as directed for allergic reaction 1 each 1    sertraline (ZOLOFT) 25 MG tablet Take 25 mg by mouth daily      propranolol (INDERAL) 20 MG tablet Take 1 tablet by mouth 3 times daily as needed (anxiety) (Patient not taking: Reported on 12/10/2020) 90 tablet 0     No current facility-administered medications for this visit. Allergies   Allergen Reactions    Bee Venom Anaphylaxis       Health Maintenance   Topic Date Due    Hepatitis C screen  Never done    Varicella vaccine (1 of 2 - 2-dose childhood series) Never done    Pneumococcal 0-64 years Vaccine (1 of 2 - PPSV23) Never done    HIV screen  Never done    Flu vaccine (1) Never done    Potassium monitoring  09/21/2022    Creatinine monitoring  09/21/2022    Cervical cancer screen  11/06/2022    DTaP/Tdap/Td vaccine (2 - Td or Tdap) 07/28/2026    COVID-19 Vaccine  Completed    Hepatitis A vaccine  Aged Out    Hepatitis B vaccine  Aged Out    Hib vaccine  Aged Out    Meningococcal (ACWY) vaccine  Aged Out       Subjective:      Review of Systems    Objective:     Vitals:    09/24/21 1342   BP: 114/72   Site: Right Upper Arm   Position: Sitting   Cuff Size: Large Adult   Pulse: 88   Temp: 98.6 °F (37 °C)   TempSrc: Tympanic   SpO2: 97%   Weight: 249 lb 6.4 oz (113.1 kg)   Height: 5' 2\" (1.575 m)     Physical Exam  Constitutional:       General: She is not in acute distress. Appearance: Normal appearance. HENT:      Head: Normocephalic and atraumatic. Eyes:      Conjunctiva/sclera: Conjunctivae normal.   Cardiovascular:      Rate and Rhythm: Normal rate and regular rhythm. Heart sounds: Normal heart sounds. Pulmonary:      Effort: Pulmonary effort is normal. No respiratory distress.       Breath sounds: Normal breath sounds. Abdominal:      General: Bowel sounds are normal. There is no distension. Palpations: Abdomen is soft. Tenderness: There is no abdominal tenderness. Musculoskeletal:      Right lower leg: No edema. Left lower leg: No edema. Skin:     General: Skin is warm and dry. Neurological:      General: No focal deficit present. Mental Status: She is alert and oriented to person, place, and time. Psychiatric:         Mood and Affect: Mood normal.         Assessment:      1. Essential hypertension  -     metoprolol tartrate (LOPRESSOR) 25 MG tablet; Take 1 tab by mouth BID. If BP still elevated after 7 days, increase to 2 tabs BID., Disp-120 tablet, R-1Normal  2. Thyroid nodule  -     US THYROID; Future  3. Dysmenorrhea  4. Mixed bipolar affective disorder, moderate (HCC)         Plan:      Declined flu vaccine today. Return in about 4 months (around 1/24/2022) for well woman exam with Pap; phone call in 2 weeks with status update on BP. Orders Placed This Encounter   Procedures    US THYROID     Standing Status:   Future     Standing Expiration Date:   9/24/2022     Order Specific Question:   Reason for exam:     Answer:   enlarged thyroid gland, L > R; history of thyroid nodule on L side that was biopsied in 2016     Orders Placed This Encounter   Medications    metoprolol tartrate (LOPRESSOR) 25 MG tablet     Sig: Take 1 tab by mouth BID. If BP still elevated after 7 days, increase to 2 tabs BID. Dispense:  120 tablet     Refill:  1       Patient given educational materials - see patient instructions. Discussed use, benefit, and side effects of prescribed medications. All patient questions answered. Pt voiced understanding. Reviewed health maintenance.             Electronically signed by Nicole hTeodore DO, DO on 10/3/2021 at 11:51 PM

## 2021-09-24 NOTE — PATIENT INSTRUCTIONS
Patient Education        DASH Diet: Care Instructions  Your Care Instructions     The DASH diet is an eating plan that can help lower your blood pressure. DASH stands for Dietary Approaches to Stop Hypertension. Hypertension is high blood pressure. The DASH diet focuses on eating foods that are high in calcium, potassium, and magnesium. These nutrients can lower blood pressure. The foods that are highest in these nutrients are fruits, vegetables, low-fat dairy products, nuts, seeds, and legumes. But taking calcium, potassium, and magnesium supplements instead of eating foods that are high in those nutrients does not have the same effect. The DASH diet also includes whole grains, fish, and poultry. The DASH diet is one of several lifestyle changes your doctor may recommend to lower your high blood pressure. Your doctor may also want you to decrease the amount of sodium in your diet. Lowering sodium while following the DASH diet can lower blood pressure even further than just the DASH diet alone. Follow-up care is a key part of your treatment and safety. Be sure to make and go to all appointments, and call your doctor if you are having problems. It's also a good idea to know your test results and keep a list of the medicines you take. How can you care for yourself at home? Following the DASH diet  · Eat 4 to 5 servings of fruit each day. A serving is 1 medium-sized piece of fruit, ½ cup chopped or canned fruit, 1/4 cup dried fruit, or 4 ounces (½ cup) of fruit juice. Choose fruit more often than fruit juice. · Eat 4 to 5 servings of vegetables each day. A serving is 1 cup of lettuce or raw leafy vegetables, ½ cup of chopped or cooked vegetables, or 4 ounces (½ cup) of vegetable juice. Choose vegetables more often than vegetable juice. · Get 2 to 3 servings of low-fat and fat-free dairy each day. A serving is 8 ounces of milk, 1 cup of yogurt, or 1 ½ ounces of cheese. · Eat 6 to 8 servings of grains each day. A serving is 1 slice of bread, 1 ounce of dry cereal, or ½ cup of cooked rice, pasta, or cooked cereal. Try to choose whole-grain products as much as possible. · Limit lean meat, poultry, and fish to 2 servings each day. A serving is 3 ounces, about the size of a deck of cards. · Eat 4 to 5 servings of nuts, seeds, and legumes (cooked dried beans, lentils, and split peas) each week. A serving is 1/3 cup of nuts, 2 tablespoons of seeds, or ½ cup of cooked beans or peas. · Limit fats and oils to 2 to 3 servings each day. A serving is 1 teaspoon of vegetable oil or 2 tablespoons of salad dressing. · Limit sweets and added sugars to 5 servings or less a week. A serving is 1 tablespoon jelly or jam, ½ cup sorbet, or 1 cup of lemonade. · Eat less than 2,300 milligrams (mg) of sodium a day. If you limit your sodium to 1,500 mg a day, you can lower your blood pressure even more. · Be aware that all of these are the suggested number of servings for people who eat 1,800 to 2,000 calories a day. Your recommended number of servings may be different if you need more or fewer calories. Tips for success  · Start small. Do not try to make dramatic changes to your diet all at once. You might feel that you are missing out on your favorite foods and then be more likely to not follow the plan. Make small changes, and stick with them. Once those changes become habit, add a few more changes. · Try some of the following:  ? Make it a goal to eat a fruit or vegetable at every meal and at snacks. This will make it easy to get the recommended amount of fruits and vegetables each day. ? Try yogurt topped with fruit and nuts for a snack or healthy dessert. ? Add lettuce, tomato, cucumber, and onion to sandwiches. ? Combine a ready-made pizza crust with low-fat mozzarella cheese and lots of vegetable toppings. Try using tomatoes, squash, spinach, broccoli, carrots, cauliflower, and onions. ?  Have a variety of cut-up vegetables with a low-fat dip as an appetizer instead of chips and dip. ? Sprinkle sunflower seeds or chopped almonds over salads. Or try adding chopped walnuts or almonds to cooked vegetables. ? Try some vegetarian meals using beans and peas. Add garbanzo or kidney beans to salads. Make burritos and tacos with mashed neri beans or black beans. Where can you learn more? Go to https://[employer]. org and sign in to your [employer] account. Enter E294 in the [employer] box to learn more about \"DASH Diet: Care Instructions. \"     If you do not have an account, please click on the \"Sign Up Now\" link. Current as of: April 29, 2021               Content Version: 13.0  © 7559-0502 [employer], [employer]. Care instructions adapted under license by [facility] ([facility]). If you have questions about a medical condition or this instruction, always ask your healthcare professional. [address] any warranty or liability for your use of this information.

## 2021-12-23 DIAGNOSIS — I10 ESSENTIAL HYPERTENSION: ICD-10-CM

## 2021-12-27 NOTE — TELEPHONE ENCOUNTER
PCp out of office.        Thu called requesting a refill of the below medication which has been pended for you:     Requested Prescriptions     Pending Prescriptions Disp Refills    metoprolol tartrate (LOPRESSOR) 25 MG tablet [Pharmacy Med Name: Metoprolol Tartrate 25 MG Oral Tablet] 120 tablet 0     Sig: TAKE 1 TABLET BY MOUTH TWICE DAILY ,  IF  BLOOD  PRESSURE  IS  STILL  ELEVATED  AFTER  7  DAYS,  INCREASE  TO  2  TABLETS  TWICE  DAILY       Last Appointment Date: 9/24/2021  Next Appointment Date: 1/24/2022    Allergies   Allergen Reactions    Bee Venom Anaphylaxis

## 2022-01-24 ENCOUNTER — TELEPHONE (OUTPATIENT)
Dept: FAMILY MEDICINE CLINIC | Age: 32
End: 2022-01-24

## 2022-01-24 NOTE — TELEPHONE ENCOUNTER
Attempted to reach patient regarding missed appointment on 1/24/22. Unable to contact at this time. Left message to reschedule.

## 2022-01-28 DIAGNOSIS — I10 ESSENTIAL HYPERTENSION: ICD-10-CM

## 2022-01-31 NOTE — TELEPHONE ENCOUNTER
Danie Yuen called requesting a refill of the below medication which has been pended for you:     Requested Prescriptions     Pending Prescriptions Disp Refills    metoprolol tartrate (LOPRESSOR) 25 MG tablet [Pharmacy Med Name: Metoprolol Tartrate 25 MG Oral Tablet] 120 tablet 0     Sig: TAKE 1 TABLET BY MOUTH TWICE DAILY IF  BLOOD  PRESSURE  IS  STILL  ELEVATED  AFTER  7  DAYS  INCREASE  TO  2  TABLET  TWICE  DAILY       Last Appointment Date: Visit date not found  Next Appointment Date: Visit date not found    Allergies   Allergen Reactions    Bee Venom Anaphylaxis

## 2022-02-01 ENCOUNTER — HOSPITAL ENCOUNTER (OUTPATIENT)
Age: 32
Setting detail: SPECIMEN
Discharge: HOME | End: 2022-02-01
Payer: COMMERCIAL

## 2022-02-01 ENCOUNTER — OFFICE VISIT (OUTPATIENT)
Dept: FAMILY MEDICINE CLINIC | Age: 32
End: 2022-02-01
Payer: COMMERCIAL

## 2022-02-01 VITALS
BODY MASS INDEX: 49.1 KG/M2 | HEIGHT: 62 IN | OXYGEN SATURATION: 98 % | SYSTOLIC BLOOD PRESSURE: 122 MMHG | HEART RATE: 74 BPM | WEIGHT: 266.8 LBS | TEMPERATURE: 98.1 F | DIASTOLIC BLOOD PRESSURE: 80 MMHG

## 2022-02-01 DIAGNOSIS — R35.0 URINARY FREQUENCY: ICD-10-CM

## 2022-02-01 DIAGNOSIS — I10 ESSENTIAL HYPERTENSION: ICD-10-CM

## 2022-02-01 DIAGNOSIS — M94.0 COSTOCHONDRITIS: Primary | ICD-10-CM

## 2022-02-01 LAB
-: ABNORMAL
AMORPHOUS: ABNORMAL
BACTERIA: ABNORMAL
BILIRUBIN URINE: NEGATIVE
CASTS UA: ABNORMAL /LPF (ref 0–2)
COLOR: ABNORMAL
COMMENT UA: ABNORMAL
CRYSTALS, UA: ABNORMAL /HPF
EPITHELIAL CELLS UA: ABNORMAL /HPF (ref 0–5)
GLUCOSE URINE: NEGATIVE
KETONES, URINE: NEGATIVE
LEUKOCYTE ESTERASE, URINE: NEGATIVE
MUCUS: ABNORMAL
NITRITE, URINE: NEGATIVE
OTHER OBSERVATIONS UA: ABNORMAL
PH UA: 7 (ref 5–6)
PROTEIN UA: NEGATIVE
RBC UA: ABNORMAL /HPF (ref 0–4)
RENAL EPITHELIAL, UA: ABNORMAL /HPF
SPECIFIC GRAVITY UA: 1.01 (ref 1.01–1.02)
TRICHOMONAS: ABNORMAL
TURBIDITY: ABNORMAL
URINE HGB: NEGATIVE
UROBILINOGEN, URINE: NORMAL
WBC UA: ABNORMAL /HPF (ref 0–4)
YEAST: ABNORMAL

## 2022-02-01 PROCEDURE — 81001 URINALYSIS AUTO W/SCOPE: CPT

## 2022-02-01 PROCEDURE — 99214 OFFICE O/P EST MOD 30 MIN: CPT | Performed by: FAMILY MEDICINE

## 2022-02-01 RX ORDER — NAPROXEN 500 MG/1
TABLET ORAL
Qty: 30 TABLET | Refills: 0 | Status: SHIPPED | OUTPATIENT
Start: 2022-02-01

## 2022-02-01 RX ORDER — METOPROLOL TARTRATE 50 MG/1
50 TABLET, FILM COATED ORAL 2 TIMES DAILY
Qty: 180 TABLET | Refills: 1 | Status: SHIPPED | OUTPATIENT
Start: 2022-02-01 | End: 2022-08-02

## 2022-02-01 ASSESSMENT — PATIENT HEALTH QUESTIONNAIRE - PHQ9
SUM OF ALL RESPONSES TO PHQ QUESTIONS 1-9: 0
2. FEELING DOWN, DEPRESSED OR HOPELESS: 0
SUM OF ALL RESPONSES TO PHQ QUESTIONS 1-9: 0
SUM OF ALL RESPONSES TO PHQ QUESTIONS 1-9: 0
SUM OF ALL RESPONSES TO PHQ9 QUESTIONS 1 & 2: 0
SUM OF ALL RESPONSES TO PHQ QUESTIONS 1-9: 0
1. LITTLE INTEREST OR PLEASURE IN DOING THINGS: 0

## 2022-02-01 ASSESSMENT — ENCOUNTER SYMPTOMS
NAUSEA: 1
VOMITING: 0

## 2022-02-01 NOTE — TELEPHONE ENCOUNTER
Pt had increased her dose on her own to 50 mg BID (as was discussed with last refill) - will change to 50 mg tabs with this refill.

## 2022-02-01 NOTE — PROGRESS NOTES
[facility] [address]  [address], [address] [place]  [phone]      Max Choi is a 28 y.o. female who presents today for her medical conditions/complaints as noted below. Max Choi is c/o of Back Pain (right back, pain to ribs when taking deep breath)      HPI:     Pt here today for R-sided back pain    Started 3 days ago when she was driving to her mom's house; started gradually and became constant  Later that night, she developed HA and nausea - unsure if this was related  Pain has been unchanged since then  Started having L-sided pain yesterday - mild, not as severe as L side    Had lifted up her treadmill 2 days prior to try to move it    If she lays on her R side, it seems to help by putting pressure on the area  Has also tried Ibuprofen (not effective)    Today, she has started having pain over her ribs b/l; worse with deep breathing or with direct pressure    Has also noticed increased thirst x past few days      Past Medical History:   Diagnosis Date    Anxiety     Bipolar disease, manic ([address]     History of chicken pox     Hypertension     Panic disorder 10/2017    D[facility] by Dr. Arelis Moreno [facility]      History reviewed. No pertinent surgical history. Family History   Problem Relation Age of Onset    Depression Mother     High Blood Pressure Father     Stroke Paternal Grandmother     Breast Cancer Maternal Grandmother      Social History     Tobacco Use    Smoking status: Former Smoker    Smokeless tobacco: Never Used    Tobacco comment: quit 2016   Substance Use Topics    Alcohol use: Yes     Comment: pt states last year daily drinker 4-6 drinks/day      Current Outpatient Medications   Medication Sig Dispense Refill    naproxen (NAPROSYN) 500 MG tablet Take 1 tab by mouth twice daily with food x 5 days, then as needed.  30 tablet 0    lithium (LITHOBID) 300 MG extended release tablet Take 600 mg by mouth every 12 hours      hydrOXYzine (ATARAX) 25 MG tablet Take 25 mg by mouth 3 times daily as needed      asenapine maleate (SAPHRIS) 10 MG SUBL sublingual tablet Place 15 mg under the tongue nightly      eszopiclone (LUNESTA) 2 MG TABS Take 1 tablet by mouth nightly.  levonorgestrel-ethinyl estradiol (AVIANE;ALESSE;LESSINA) 0.1-20 MG-MCG per tablet Take 1 tablet by mouth daily 3 packet 3    EPINEPHrine (EPIPEN 2-WENDY) 0.3 MG/0.3ML SOAJ injection Use as directed for allergic reaction 1 each 1    sertraline (ZOLOFT) 25 MG tablet Take 25 mg by mouth daily      metoprolol tartrate (LOPRESSOR) 50 MG tablet Take 1 tablet by mouth 2 times daily 180 tablet 1    propranolol (INDERAL) 20 MG tablet Take 1 tablet by mouth 3 times daily as needed (anxiety) (Patient not taking: Reported on 12/10/2020) 90 tablet 0     No current facility-administered medications for this visit. Allergies   Allergen Reactions    Bee Venom Anaphylaxis       Health Maintenance   Topic Date Due    Hepatitis C screen  Never done    Varicella vaccine (1 of 2 - 2-dose childhood series) Never done    Pneumococcal 0-64 years Vaccine (1 of 2 - PPSV23) Never done    HIV screen  Never done    COVID-19 Vaccine (2 - Booster for [employer] series) 07/11/2021    Flu vaccine (1) Never done    Potassium monitoring  09/21/2022    Creatinine monitoring  10/08/2022    Cervical cancer screen  11/06/2022    Depression Monitoring  02/01/2023    DTaP/Tdap/Td vaccine (2 - Td or Tdap) 07/28/2026    Hepatitis A vaccine  Aged Out    Hepatitis B vaccine  Aged Out    Hib vaccine  Aged Out    Meningococcal (ACWY) vaccine  Aged Out       Subjective:      Review of Systems   Constitutional: Negative for chills and fever. Gastrointestinal: Positive for nausea (3 nights ago - resolved now). Negative for vomiting. Endocrine: Positive for polydipsia. Genitourinary: Positive for frequency. Negative for dysuria, hematuria and urgency.        Objective:     Vitals:    02/01/22 1030   BP: 122/80   Site: Right Upper Arm   Position: Sitting   Cuff Size: Large Adult   Pulse: 74   Temp: 98.1 °F (36.7 °C)   TempSrc: Temporal   SpO2: 98%   Weight: 266 lb 12.8 oz (121 kg)   Height: 5' 2\" (1.575 m)     Physical Exam  Constitutional:       General: She is not in acute distress. Appearance: Normal appearance. HENT:      Head: Normocephalic and atraumatic. Eyes:      Conjunctiva/sclera: Conjunctivae normal.   Cardiovascular:      Rate and Rhythm: Normal rate and regular rhythm. Heart sounds: Normal heart sounds. Pulmonary:      Effort: Pulmonary effort is normal. No respiratory distress. Breath sounds: Normal breath sounds. Abdominal:      General: Bowel sounds are normal. There is no distension. Palpations: Abdomen is soft. Tenderness: There is no abdominal tenderness. Musculoskeletal:      Thoracic back: Tenderness and bony tenderness (posterolateral ribs) present. Right lower leg: No edema. Left lower leg: No edema. Skin:     General: Skin is warm and dry. Neurological:      General: No focal deficit present. Mental Status: She is alert and oriented to person, place, and time. Psychiatric:         Mood and Affect: Mood normal.         Assessment:      1. Costochondritis  -     naproxen (NAPROSYN) 500 MG tablet; Take 1 tab by mouth twice daily with food x 5 days, then as needed. , Disp-30 tablet, R-0Normal  2. Urinary frequency  -     Urinalysis Reflex to Culture; Future  3. Essential hypertension         Plan:      Return in about 6 weeks (around 3/15/2022) for well woman exam with Pap at 10:00 am.    Orders Placed This Encounter   Procedures    Urinalysis Reflex to Culture     Standing Status:   Future     Number of Occurrences:   1     Standing Expiration Date:   2/1/2023     Order Specific Question:   SPECIFY(EX-CATH,MIDSTREAM,CYSTO,ETC)?      Answer:   mid-stream     Orders Placed This Encounter   Medications    naproxen (NAPROSYN) 500 MG tablet     Sig: Take 1 tab by mouth twice daily with food x 5 days, then as needed. Dispense:  30 tablet     Refill:  0       Patient given educational materials - see patient instructions. Discussed use, benefit, and side effects of prescribed medications. All patient questions answered. Pt voiced understanding. Reviewed health maintenance.             Electronically signed by Jessie Brennan DO, DO on 2/13/2022 at 11:56 PM

## 2022-03-18 ENCOUNTER — OFFICE VISIT (OUTPATIENT)
Dept: FAMILY MEDICINE CLINIC | Age: 32
End: 2022-03-18
Payer: COMMERCIAL

## 2022-03-18 ENCOUNTER — HOSPITAL ENCOUNTER (OUTPATIENT)
Age: 32
Setting detail: SPECIMEN
Discharge: HOME | End: 2022-03-18
Payer: COMMERCIAL

## 2022-03-18 VITALS
WEIGHT: 253.6 LBS | TEMPERATURE: 98.2 F | OXYGEN SATURATION: 98 % | HEIGHT: 62 IN | SYSTOLIC BLOOD PRESSURE: 118 MMHG | HEART RATE: 80 BPM | BODY MASS INDEX: 46.67 KG/M2 | DIASTOLIC BLOOD PRESSURE: 82 MMHG

## 2022-03-18 DIAGNOSIS — Z12.4 SCREENING FOR CERVICAL CANCER: ICD-10-CM

## 2022-03-18 DIAGNOSIS — Z01.419 ENCOUNTER FOR WELL WOMAN EXAM WITH ROUTINE GYNECOLOGICAL EXAM: Primary | ICD-10-CM

## 2022-03-18 PROCEDURE — G0145 SCR C/V CYTO,THINLAYER,RESCR: HCPCS

## 2022-03-18 PROCEDURE — 99395 PREV VISIT EST AGE 18-39: CPT | Performed by: FAMILY MEDICINE

## 2022-03-18 PROCEDURE — 87624 HPV HI-RISK TYP POOLED RSLT: CPT

## 2022-03-18 RX ORDER — ARIPIPRAZOLE 5 MG/1
TABLET ORAL
COMMUNITY
Start: 2022-03-02

## 2022-03-18 RX ORDER — ARIPIPRAZOLE 10 MG/1
TABLET ORAL
COMMUNITY
Start: 2022-03-10

## 2022-03-18 ASSESSMENT — ENCOUNTER SYMPTOMS: BLOOD IN STOOL: 0

## 2022-03-18 NOTE — PROGRESS NOTES
[facility] [address]  [address], [address] [place]  [phone]      Sadi Keller is a 33 y.o. female who presents today for her medical conditions/complaints as noted below. Sadi Keller is c/o of Gynecologic Exam      HPI:     Pt here today for well woman exam.    Last Pap smear 9/15/16 - normal.  No h/o abnormal Pap smears.  LMP 3/7/22 - regular.  .  No h/o STD's.  No h/o gynecologic surgeries.  Currently sexually active with one male partner - using condoms every time.  Denies dyspareunia or post-coital bleeding.  Gets hot easily, but denies hot flashes; also denies vaginal dryness.  Denies vaginal discharge, odor, itching/burning, or sore/rashes.  Denies family h/o cervical, ovarian, or endometrial CA.     Denies breast lumps, pain, nipple discharge, or skin changes.  No h/o mammogram.  Maternal grandmother and her mother had breast CA - thinks MGM was in her 42-54's when diagnosed.  Denies family h/o colon CA. Drinking green drinks in the morning with spinach and fruit each morning. Trying to lose weight - already down 13 lbs since her last OV here on 22. Past Medical History:   Diagnosis Date    Anxiety     Bipolar disease, manic ([address]     History of chicken pox     Hypertension     Panic disorder 10/2017    D[facility] Dr[facility] Geraldine Alford [facility]      History reviewed. No pertinent surgical history.   Family History   Problem Relation Age of Onset    Depression Mother     High Blood Pressure Father     Stroke Paternal Grandmother     Breast Cancer Maternal Grandmother      Social History     Tobacco Use    Smoking status: Former Smoker    Smokeless tobacco: Never Used    Tobacco comment: quit 2016   Substance Use Topics    Alcohol use: Yes     Comment: pt states last year daily drinker 4-6 drinks/day      Current Outpatient Medications   Medication Sig Dispense Refill    ARIPiprazole (ABILIFY) 10 MG tablet       ARIPiprazole (ABILIFY) 5 MG tablet  metoprolol tartrate (LOPRESSOR) 50 MG tablet Take 1 tablet by mouth 2 times daily 180 tablet 1    naproxen (NAPROSYN) 500 MG tablet Take 1 tab by mouth twice daily with food x 5 days, then as needed. 30 tablet 0    lithium (LITHOBID) 300 MG extended release tablet Take 600 mg by mouth every 12 hours      hydrOXYzine (ATARAX) 25 MG tablet Take 25 mg by mouth 3 times daily as needed      eszopiclone (LUNESTA) 2 MG TABS Take 1 tablet by mouth nightly.  levonorgestrel-ethinyl estradiol (AVIANE;ALESSE;LESSINA) 0.1-20 MG-MCG per tablet Take 1 tablet by mouth daily 3 packet 3    EPINEPHrine (EPIPEN 2-WENDY) 0.3 MG/0.3ML SOAJ injection Use as directed for allergic reaction 1 each 1    asenapine maleate (SAPHRIS) 10 MG SUBL sublingual tablet Place 15 mg under the tongue nightly (Patient not taking: Reported on 3/18/2022)      sertraline (ZOLOFT) 25 MG tablet Take 25 mg by mouth daily (Patient not taking: Reported on 3/18/2022)      propranolol (INDERAL) 20 MG tablet Take 1 tablet by mouth 3 times daily as needed (anxiety) (Patient not taking: Reported on 12/10/2020) 90 tablet 0     No current facility-administered medications for this visit.      Allergies   Allergen Reactions    Bee Venom Anaphylaxis       Health Maintenance   Topic Date Due    Hepatitis C screen  Never done    Varicella vaccine (1 of 2 - 2-dose childhood series) Never done    Pneumococcal 0-64 years Vaccine (1 of 2 - PPSV23) Never done    HIV screen  Never done    COVID-19 Vaccine (2 - Booster for [employer] series) 07/11/2021    Flu vaccine (1) 06/30/2022 (Originally 9/1/2021)    Potassium monitoring  09/21/2022    Creatinine monitoring  10/08/2022    Depression Monitoring  02/01/2023    DTaP/Tdap/Td vaccine (2 - Td or Tdap) 07/28/2026    Cervical cancer screen  03/18/2027    Hepatitis A vaccine  Aged Out    Hepatitis B vaccine  Aged Out    Hib vaccine  Aged Out    Meningococcal (ACWY) vaccine  Aged Out       Subjective: Review of Systems   Constitutional: Negative for fever. Gastrointestinal: Negative for blood in stool. Genitourinary: Negative for dyspareunia, dysuria, genital sores, hematuria, menstrual problem and vaginal discharge. Objective:     Vitals:    03/18/22 1053   BP: 118/82   Site: Right Upper Arm   Position: Sitting   Cuff Size: Large Adult   Pulse: 80   Temp: 98.2 °F (36.8 °C)   TempSrc: Temporal   SpO2: 98%   Weight: 253 lb 9.6 oz (115 kg)   Height: 5' 2\" (1.575 m)     Physical Exam  Vitals and nursing note reviewed. Constitutional:       General: She is not in acute distress. Appearance: She is well-developed. HENT:      Head: Normocephalic and atraumatic. Eyes:      Conjunctiva/sclera: Conjunctivae normal.   Neck:      Thyroid: No thyromegaly. Cardiovascular:      Rate and Rhythm: Normal rate and regular rhythm. Heart sounds: Normal heart sounds. Pulmonary:      Effort: Pulmonary effort is normal. No respiratory distress. Breath sounds: Normal breath sounds. Chest:   Breasts:      Right: No inverted nipple, mass, nipple discharge, skin change or tenderness. Left: No inverted nipple, mass, nipple discharge, skin change or tenderness. Abdominal:      General: Bowel sounds are normal. There is no distension. Palpations: Abdomen is soft. Tenderness: There is no abdominal tenderness. Genitourinary:     Exam position: Supine. Labia:         Right: No rash or lesion. Left: No rash or lesion. Vagina: Normal.      Cervix: No cervical motion tenderness. Uterus: Not enlarged and not tender. Adnexa:         Right: No tenderness or fullness. Left: No tenderness or fullness. Musculoskeletal:      Cervical back: Neck supple. Skin:     General: Skin is warm and dry. Findings: No lesion. Neurological:      Mental Status: She is alert and oriented to person, place, and time. Assessment:      1.  Encounter for well woman exam with routine gynecological exam  2. Screening for cervical cancer  -     PAP SMEAR; Future         Plan:      Return in about 6 months (around 9/18/2022) for f/u HTN, meds. Orders Placed This Encounter   Procedures    PAP SMEAR     Patient History:    No LMP recorded. OBGYN Status: Having periods  History reviewed. No pertinent surgical history. Medications/Contraceptives Affecting Cytology     Combination Contraceptives - Oral Disp Start End     levonorgestrel-ethinyl estradiol (AVIANE;ALESSE;LESSINA) 0.1-20 MG-MCG   per tablet    3 packet 9/1/2020     Sig: Take 1 tablet by mouth daily    Route: Oral        Social History    Tobacco Use      Smoking status: Former Smoker      Smokeless tobacco: Never Used      Tobacco comment: quit 2016       Standing Status:   Future     Number of Occurrences:   1     Standing Expiration Date:   3/18/2023     Order Specific Question:   Collection Type     Answer: Thin Prep     Order Specific Question:   Prior Abnormal Pap Test     Answer:   No     Order Specific Question:   Screening or Diagnostic     Answer:   Screening     Order Specific Question:   HPV Requested? Answer:   Yes     Order Specific Question:   High Risk Patient     Answer:   N/A     No orders of the defined types were placed in this encounter. Patient given educational materials - see patient instructions. Discussed use, benefit, and side effects of prescribed medications. All patient questions answered. Pt voiced understanding. Reviewed health maintenance.             Electronically signed by Litzy Zarate DO, DO on 3/27/2022 at 11:37 PM

## 2022-03-18 NOTE — PATIENT INSTRUCTIONS
Patient Education        Well Visit, Ages 25 to 48: Care Instructions  Overview     Well visits can help you stay healthy. Your doctor has checked your overall health and may have suggested ways to take good care of yourself. Your doctor also may have recommended tests. At home, you can help prevent illness with healthy eating, regular exercise, and other steps. Follow-up care is a key part of your treatment and safety. Be sure to make and go to all appointments, and call your doctor if you are having problems. It's also a good idea to know your test results and keep a list of the medicines you take. How can you care for yourself at home? · Get screening tests that you and your doctor decide on. Screening helps find diseases before any symptoms appear. · Eat healthy foods. Choose fruits, vegetables, whole grains, protein, and low-fat dairy foods. Limit fat, especially saturated fat. Reduce salt in your diet. · Limit alcohol. If you are a man, have no more than 2 drinks a day or 14 drinks a week. If you are a woman, have no more than 1 drink a day or 7 drinks a week. · Get at least 30 minutes of physical activity on most days of the week. Walking is a good choice. You also may want to do other activities, such as running, swimming, cycling, or playing tennis or team sports. Discuss any changes in your exercise program with your doctor. · Reach and stay at a healthy weight. This will lower your risk for many problems, such as obesity, diabetes, heart disease, and high blood pressure. · Do not smoke or allow others to smoke around you. If you need help quitting, talk to your doctor about stop-smoking programs and medicines. These can increase your chances of quitting for good. · Care for your mental health. It is easy to get weighed down by worry and stress. Learn strategies to manage stress, like deep breathing and mindfulness, and stay connected with your family and community.  If you find you often feel sad or hopeless, talk with your doctor. Treatment can help. · Talk to your doctor about whether you have any risk factors for sexually transmitted infections (STIs). You can help prevent STIs if you wait to have sex with a new partner (or partners) until you've each been tested for STIs. It also helps if you use condoms (male or female condoms) and if you limit your sex partners to one person who only has sex with you. Vaccines are available for some STIs, such as HPV. · Use birth control if it's important to you to prevent pregnancy. Talk with your doctor about the choices available and what might be best for you. · If you think you may have a problem with alcohol or drug use, talk to your doctor. This includes prescription medicines (such as amphetamines and opioids) and illegal drugs (such as cocaine and methamphetamine). Your doctor can help you figure out what type of treatment is best for you. · Protect your skin from too much sun. When you're outdoors from 10 a.m. to 4 p.m., stay in the shade or cover up with clothing and a hat with a wide brim. Wear sunglasses that block UV rays. Even when it's cloudy, put broad-spectrum sunscreen (SPF 30 or higher) on any exposed skin. · See a dentist one or two times a year for checkups and to have your teeth cleaned. · Wear a seat belt in the car. When should you call for help? Watch closely for changes in your health, and be sure to contact your doctor if you have any problems or symptoms that concern you. Where can you learn more? Go to [email or web address] and sign in to your [employer] account. Enter P072 in the Singspiel box to learn more about \"Well Visit, Ages 25 to 48: Care Instructions. \"     If you do not have an account, please click on the \"Sign Up Now\" link. Current as of: October 6, 2021               Content Version: 13.1  © 3989-7876 Healthwise, Incorporated.    Care instructions adapted under license by [employer] [facility]. If you have questions about a medical condition or this instruction, always ask your healthcare professional. [place] [address] any warranty or liability for your use of this information.

## 2022-03-22 LAB
HPV SAMPLE: NORMAL
HPV, GENOTYPE 16: NOT DETECTED
HPV, GENOTYPE 18: NOT DETECTED
HPV, HIGH RISK OTHER: NOT DETECTED
HPV, INTERPRETATION: NORMAL
SPECIMEN DESCRIPTION: NORMAL

## 2022-03-24 LAB — CYTOLOGY REPORT: NORMAL

## 2022-07-18 ENCOUNTER — TELEPHONE (OUTPATIENT)
Dept: FAMILY MEDICINE CLINIC | Age: 32
End: 2022-07-18

## 2022-08-02 DIAGNOSIS — I10 ESSENTIAL HYPERTENSION: ICD-10-CM

## 2022-08-02 RX ORDER — METOPROLOL TARTRATE 50 MG/1
TABLET, FILM COATED ORAL
Qty: 180 TABLET | Refills: 0 | Status: SHIPPED | OUTPATIENT
Start: 2022-08-02

## 2022-08-02 NOTE — TELEPHONE ENCOUNTER
[facility] called requesting a refill of the below medication which has been pended for you:     Requested Prescriptions     Pending Prescriptions Disp Refills    metoprolol tartrate (LOPRESSOR) 50 MG tablet [Pharmacy Med Name: Metoprolol Tartrate 50 MG Oral Tablet] 180 tablet 0     Sig: Take 1 tablet by mouth twice daily       Last Appointment Date: 3/18/2022  Next Appointment Date: 9/19/2022    Allergies   Allergen Reactions    Bee Venom Anaphylaxis

## 2022-09-01 ENCOUNTER — TELEPHONE (OUTPATIENT)
Dept: FAMILY MEDICINE CLINIC | Age: 32
End: 2022-09-01

## 2022-09-01 NOTE — TELEPHONE ENCOUNTER
Spoke with patient. States she has been having swollen lymph nodes and the right lymph node is bothering her. Patient denies any fever or respiratory symptom. Patient scheduled with tj 9/2 at 1120am for evaluation.

## 2022-09-02 ENCOUNTER — OFFICE VISIT (OUTPATIENT)
Dept: FAMILY MEDICINE CLINIC | Age: 32
End: 2022-09-02
Payer: COMMERCIAL

## 2022-09-02 VITALS
HEART RATE: 60 BPM | BODY MASS INDEX: 43.43 KG/M2 | TEMPERATURE: 98.6 F | DIASTOLIC BLOOD PRESSURE: 74 MMHG | OXYGEN SATURATION: 97 % | SYSTOLIC BLOOD PRESSURE: 122 MMHG | WEIGHT: 236 LBS | HEIGHT: 62 IN

## 2022-09-02 DIAGNOSIS — Z12.4 SCREENING FOR CERVICAL CANCER: ICD-10-CM

## 2022-09-02 DIAGNOSIS — E04.1 THYROID NODULE: ICD-10-CM

## 2022-09-02 DIAGNOSIS — R59.9 SWELLING OF LYMPH NODE: Primary | ICD-10-CM

## 2022-09-02 PROCEDURE — 99214 OFFICE O/P EST MOD 30 MIN: CPT | Performed by: FAMILY MEDICINE

## 2022-09-02 RX ORDER — AMOXICILLIN 875 MG/1
875 TABLET, COATED ORAL 2 TIMES DAILY
Qty: 20 TABLET | Refills: 0 | Status: SHIPPED | OUTPATIENT
Start: 2022-09-02 | End: 2022-09-12

## 2022-09-02 RX ORDER — BUSPIRONE HYDROCHLORIDE 7.5 MG/1
TABLET ORAL
COMMUNITY
Start: 2022-08-26

## 2022-09-02 RX ORDER — LUMATEPERONE 42 MG/1
CAPSULE ORAL
COMMUNITY
Start: 2022-08-31

## 2022-09-02 NOTE — PROGRESS NOTES
[facility] [address]  [address], [address] [place]  [phone]      Blanca Cintron is a 33 y.o. female who presents today for her medical conditions/complaints as noted below. Blanca Cintron is c/o of Neck Pain (Swollen left lymph node)      HPI:     Pt here today for swollen lymph nodes. Approx one month ago, she noticed enlargement of b/l submandibular lymph nodes - became tender and visible/palpably swollen. Went to [facility] in [place] - started Prednisone course x 7 days, which did help approx 50%. Still felt like the lymph nodes were firm. Now, the L side still feels more swollen and more firm than the R side. Knows that she has a nervous tic of picking the skin around her ears; trying to cut down on this to see if that is causing the lymph node enlargement. Has also had constant rhinorrhea that she attributed to allergies, which is not typical for her.  2 days ago, she started to have sore throat with swallowing - mostly on the R side. Today, this is better. Has used ice and antibiotic ointment on her ears. Still needs to have thyroid US done for history of thyroid nodule and enlargement on L side. Past Medical History:   Diagnosis Date    Anxiety     Bipolar disease, manic ([address]     History of chicken pox     Hypertension     Panic disorder 10/2017    Dx by Dr. Darling Gaviria from [facility]      History reviewed. No pertinent surgical history.   Family History   Problem Relation Age of Onset    Depression Mother     High Blood Pressure Father     Stroke Paternal Grandmother     Breast Cancer Maternal Grandmother      Social History     Tobacco Use    Smoking status: Former    Smokeless tobacco: Never    Tobacco comments:     quit 2016   Substance Use Topics    Alcohol use: Yes     Comment: pt states last year daily drinker 4-6 drinks/day      Current Outpatient Medications   Medication Sig Dispense Refill    busPIRone (BUSPAR) 7.5 MG tablet TAKE 1 TABLET BY MOUTH cancer screen  03/18/2027    Hepatitis A vaccine  Aged Out    Hepatitis B vaccine  Aged Out    Hib vaccine  Aged Out    Meningococcal (ACWY) vaccine  Aged Out       Subjective:      Review of Systems   HENT:  Negative for trouble swallowing. Skin:  Negative for color change. Objective:     Vitals:    09/02/22 1151   BP: 122/74   Site: Right Upper Arm   Position: Sitting   Cuff Size: Large Adult   Pulse: 60   Temp: 98.6 °F (37 °C)   TempSrc: Temporal   SpO2: 97%   Weight: 236 lb (107 kg)   Height: 5' 2\" (1.575 m)     Physical Exam  Constitutional:       General: She is not in acute distress. Appearance: Normal appearance. HENT:      Head: Normocephalic and atraumatic. Eyes:      Conjunctiva/sclera: Conjunctivae normal.   Neck:      Comments: Enlarged lesion/lymph node just inferior to L ear; tender, firm, enlarged   Cardiovascular:      Rate and Rhythm: Normal rate and regular rhythm. Heart sounds: Normal heart sounds. Pulmonary:      Effort: Pulmonary effort is normal. No respiratory distress. Breath sounds: Normal breath sounds. Musculoskeletal:      Right lower leg: No edema. Left lower leg: No edema. Skin:     General: Skin is warm and dry. Neurological:      General: No focal deficit present. Mental Status: She is alert and oriented to person, place, and time. Psychiatric:         Mood and Affect: Mood normal.       Assessment:      1. Swelling of lymph node  -     amoxicillin (AMOXIL) 875 MG tablet; Take 1 tablet by mouth 2 times daily for 10 days, Disp-20 tablet, R-0Normal  -     US HEAD NECK SOFT TISSUE THYROID; Future  2. Thyroid nodule  -     US HEAD NECK SOFT TISSUE THYROID; Future  3. Screening for cervical cancer  -     Magali Suarez, JOSY, OB/GYN, [place]       Plan:      Return in about 6 weeks (around 10/14/2022) for f/u HTN, lymph nodes.     Orders Placed This Encounter   Procedures    US HEAD NECK SOFT TISSUE THYROID     ALSO has swollen lymph node just inferior to L ear; tender, firm, enlarged - PLEASE CHECK BOTH AREAS AND COMMENT!!!     Standing Status:   Future     Standing Expiration Date:   9/2/2023     Order Specific Question:   Reason for exam:     Answer:   enlarged thyroid gland, L > R; history of thyroid nodule on L side that was biopsied in 2016    [facility] - [place], NP, OB/GYN, [place]     Referral Priority:   Routine     Referral Type:   Eval and Treat     Referral Reason:   Specialty Services Required     Referred to Provider:   MARCELLE Cleary CNP     Requested Specialty:   Obstetrics & Gynecology     Number of Visits Requested:   1     Orders Placed This Encounter   Medications    amoxicillin (AMOXIL) 875 MG tablet     Sig: Take 1 tablet by mouth 2 times daily for 10 days     Dispense:  20 tablet     Refill:  0       Patient given educational materials - see patient instructions. Discussed use, benefit, and side effects of prescribed medications. All patient questions answered. Pt voiced understanding. Reviewed health maintenance.             Electronically signed by Beatriz Davis DO, DO on 9/12/2022 at 12:08 AM

## 2022-09-12 ASSESSMENT — ENCOUNTER SYMPTOMS
TROUBLE SWALLOWING: 0
COLOR CHANGE: 0

## 2022-09-13 ENCOUNTER — HOSPITAL ENCOUNTER (OUTPATIENT)
Dept: INTERVENTIONAL RADIOLOGY/VASCULAR | Age: 32
Discharge: HOME | End: 2022-09-15
Payer: COMMERCIAL

## 2022-09-13 DIAGNOSIS — E04.1 THYROID NODULE: ICD-10-CM

## 2022-09-13 DIAGNOSIS — R59.9 SWELLING OF LYMPH NODE: ICD-10-CM

## 2022-09-13 PROCEDURE — 76536 US EXAM OF HEAD AND NECK: CPT

## 2022-09-15 DIAGNOSIS — E04.1 LEFT THYROID NODULE: ICD-10-CM

## 2022-09-15 DIAGNOSIS — E04.1 RIGHT THYROID NODULE: Primary | ICD-10-CM

## 2022-09-16 ENCOUNTER — TELEPHONE (OUTPATIENT)
Dept: FAMILY MEDICINE CLINIC | Age: 32
End: 2022-09-16

## 2022-09-16 NOTE — TELEPHONE ENCOUNTER
Call from [address] [facility], wanted to let us know they received the order for thyroid FNA, contacted pt who does not wish to have this done until October. Scheduled for then.

## 2022-10-05 DIAGNOSIS — E04.1 LEFT THYROID NODULE: ICD-10-CM

## 2022-10-18 ENCOUNTER — TELEPHONE (OUTPATIENT)
Dept: FAMILY MEDICINE CLINIC | Age: 32
End: 2022-10-18

## 2022-10-18 DIAGNOSIS — E04.1 THYROID NODULE: Primary | ICD-10-CM

## 2022-10-18 NOTE — TELEPHONE ENCOUNTER
FNA of thyroid nodule results scanned into chart from 10/4 - please apologize to pt that we did not have these before now to call her with. FNA shows benign follicular nodule with possible background thyroiditis. Since nodule is benign, no further work-up is needed. We will still check thyroid US again in 1 year for the other nodule we are monitoring. The background thyroiditis means the thyroid could be otherwise inflamed and they recommend correlating with updated thyroid levels; last levels were done in 2019. Will place order now for levels and she can do this at her convenience; if normal, will just check thyroid US again in 9/2023.

## 2022-11-07 ENCOUNTER — TELEPHONE (OUTPATIENT)
Dept: OBGYN | Age: 32
End: 2022-11-07

## 2022-11-07 DIAGNOSIS — I10 ESSENTIAL HYPERTENSION: ICD-10-CM

## 2022-11-07 NOTE — TELEPHONE ENCOUNTER
Pt. Needs rescheduled d/t provider availability. Attempted to contact patient regarding appoint. There was no answer and I was unable to leave a message due to the voicemail box being full.

## 2022-11-08 NOTE — TELEPHONE ENCOUNTER
Obdulia Fowler called requesting a refill of the below medication which has been pended for you:     Requested Prescriptions     Pending Prescriptions Disp Refills    metoprolol tartrate (LOPRESSOR) 50 MG tablet [Pharmacy Med Name: Metoprolol Tartrate 50 MG Oral Tablet] 180 tablet 0     Sig: Take 1 tablet by mouth twice daily       Last Appointment Date: 9/2/2022  Next Appointment Date: 11/16/2022    Allergies   Allergen Reactions    Bee Venom Anaphylaxis    Lamotrigine      rash

## 2022-11-09 RX ORDER — METOPROLOL TARTRATE 50 MG/1
TABLET, FILM COATED ORAL
Qty: 180 TABLET | Refills: 1 | Status: SHIPPED | OUTPATIENT
Start: 2022-11-09

## 2022-11-16 ENCOUNTER — OFFICE VISIT (OUTPATIENT)
Dept: FAMILY MEDICINE CLINIC | Age: 32
End: 2022-11-16
Payer: COMMERCIAL

## 2022-11-16 VITALS
HEART RATE: 71 BPM | OXYGEN SATURATION: 99 % | WEIGHT: 244 LBS | TEMPERATURE: 98.4 F | DIASTOLIC BLOOD PRESSURE: 82 MMHG | SYSTOLIC BLOOD PRESSURE: 118 MMHG | BODY MASS INDEX: 44.9 KG/M2 | HEIGHT: 62 IN

## 2022-11-16 DIAGNOSIS — E04.1 THYROID NODULE: ICD-10-CM

## 2022-11-16 DIAGNOSIS — I10 ESSENTIAL HYPERTENSION: ICD-10-CM

## 2022-11-16 DIAGNOSIS — N92.0 MENORRHAGIA WITH REGULAR CYCLE: Primary | ICD-10-CM

## 2022-11-16 PROBLEM — T50.905A ADVERSE EFFECT OF UNSPECIFIED DRUGS, MEDICAMENTS AND BIOLOGICAL SUBSTANCES, INITIAL ENCOUNTER: Status: ACTIVE | Noted: 2022-11-15

## 2022-11-16 PROBLEM — Z79.899 OTHER LONG TERM (CURRENT) DRUG THERAPY: Status: ACTIVE | Noted: 2021-12-15

## 2022-11-16 PROCEDURE — 99214 OFFICE O/P EST MOD 30 MIN: CPT | Performed by: FAMILY MEDICINE

## 2022-11-16 PROCEDURE — 3078F DIAST BP <80 MM HG: CPT | Performed by: FAMILY MEDICINE

## 2022-11-16 PROCEDURE — 3074F SYST BP LT 130 MM HG: CPT | Performed by: FAMILY MEDICINE

## 2022-11-16 RX ORDER — PALIPERIDONE 6 MG/1
12 TABLET, EXTENDED RELEASE ORAL 2 TIMES DAILY
COMMUNITY
Start: 2022-10-28

## 2022-11-16 RX ORDER — PALIPERIDONE 9 MG/1
TABLET, EXTENDED RELEASE ORAL
COMMUNITY
Start: 2022-10-03 | End: 2022-11-16 | Stop reason: DRUGHIGH

## 2022-11-16 RX ORDER — LITHIUM CARBONATE 300 MG
900 TABLET ORAL 2 TIMES DAILY
COMMUNITY

## 2022-11-16 RX ORDER — BUSPIRONE HYDROCHLORIDE 10 MG/1
TABLET ORAL
COMMUNITY
Start: 2022-10-03 | End: 2022-11-16 | Stop reason: DRUGHIGH

## 2022-11-16 RX ORDER — ESZOPICLONE 3 MG/1
3 TABLET, FILM COATED ORAL NIGHTLY
COMMUNITY

## 2022-11-16 ASSESSMENT — ENCOUNTER SYMPTOMS: ABDOMINAL PAIN: 0

## 2022-11-16 NOTE — PROGRESS NOTES
[facility] [address]  [address], [address] [place]  [phone]      Sanjay Melendez is a 33 y.o. female who presents today for her medical conditions/complaints as noted below. Sanjay Melendez is c/o of Hypertension      HPI:     Pt here today for follow-up of HTN. Just saw her Psychiatrist this AM - had labwork done yesterday at [facility] that she had sent here as well. Pt does think that she had thyroid levels checked with that labwork. Will also be having Lithium levels re-checked (just changed her dose) again in 2-3 months. Has not been taking her OCP's - at first, she just forgot for a short while, but then she decided to stop them completely. Still wants to be on something for birth control - thinking about Depo injection. Periods are sporadic, but still has at least one per month, sometimes just >30 days between. LMP 10 days ago. Taking Lopressor 50 mg BID for HTN - stable. BP well-controlled today - 118/82. States her BP readings are usually normal at her Psychiatry appt's as well. Past Medical History:   Diagnosis Date    Anxiety     Bipolar disease, manic ([address]     History of chicken pox     Hypertension     Panic disorder 10/2017    Dx by Dr. Christiano Bonilla from [facility]      History reviewed. No pertinent surgical history.   Family History   Problem Relation Age of Onset    Depression Mother     High Blood Pressure Father     Stroke Paternal Grandmother     Breast Cancer Maternal Grandmother      Social History     Tobacco Use    Smoking status: Former    Smokeless tobacco: Never    Tobacco comments:     quit 2016   Substance Use Topics    Alcohol use: Yes     Comment: pt states last year daily drinker 4-6 drinks/day      Current Outpatient Medications   Medication Sig Dispense Refill    paliperidone (INVEGA) 6 MG extended release tablet Take 12 mg by mouth in the morning and at bedtime      lithium 300 MG tablet Take 900 mg by mouth in the morning and at bedtime eszopiclone 3 MG TABS Take 3 mg by mouth nightly. metoprolol tartrate (LOPRESSOR) 50 MG tablet Take 1 tablet by mouth twice daily 180 tablet 1    busPIRone (BUSPAR) 7.5 MG tablet TAKE 1 TABLET BY MOUTH TWICE DAILY      hydrOXYzine (ATARAX) 25 MG tablet Take 25 mg by mouth 3 times daily as needed      EPINEPHrine (EPIPEN 2-WENDY) 0.3 MG/0.3ML SOAJ injection Use as directed for allergic reaction 1 each 1     No current facility-administered medications for this visit. Allergies   Allergen Reactions    Bee Venom Anaphylaxis    Gabapentin Other (See Comments)    Lamotrigine      rash       Health Maintenance   Topic Date Due    Varicella vaccine (1 of 2 - 2-dose childhood series) Never done    HIV screen  Never done    Hepatitis C screen  Never done    COVID-19 Vaccine (2 - Booster for Snow series) 07/11/2021    Flu vaccine (1) Never done    Depression Monitoring  02/01/2023    DTaP/Tdap/Td vaccine (2 - Td or Tdap) 07/28/2026    Cervical cancer screen  03/18/2027    Hepatitis A vaccine  Aged Out    Hib vaccine  Aged Out    Meningococcal (ACWY) vaccine  Aged Out    Pneumococcal 0-64 years Vaccine  Aged Out       Subjective:      Review of Systems   Constitutional:  Unexpected weight change: has seen increased appetite and some weight gain due to the [place]. Eyes:  Positive for visual disturbance (infrequent). Cardiovascular:  Negative for chest pain and leg swelling. Gastrointestinal:  Negative for abdominal pain. Objective:     Vitals:    11/16/22 1143   BP: 118/82   Site: Right Upper Arm   Position: Sitting   Cuff Size: Large Adult   Pulse: 71   Temp: 98.4 °F (36.9 °C)   TempSrc: Temporal   SpO2: 99%   Weight: 244 lb (110.7 kg)   Height: 5' 2\" (1.575 m)     Physical Exam  Constitutional:       General: She is not in acute distress. Appearance: Normal appearance. HENT:      Head: Normocephalic and atraumatic.    Eyes:      Conjunctiva/sclera: Conjunctivae normal.   Cardiovascular:      Rate and Rhythm: Normal rate and regular rhythm. Heart sounds: Normal heart sounds. Pulmonary:      Effort: Pulmonary effort is normal. No respiratory distress. Breath sounds: Normal breath sounds. Abdominal:      General: Bowel sounds are normal. There is no distension. Palpations: Abdomen is soft. Tenderness: There is no abdominal tenderness. Musculoskeletal:      Right lower leg: No edema. Left lower leg: No edema. Skin:     General: Skin is warm and dry. Neurological:      General: No focal deficit present. Mental Status: She is alert and oriented to person, place, and time. Psychiatric:         Mood and Affect: Mood normal.       Assessment:      1. Menorrhagia with regular cycle  -     Pregnancy, Urine; Future  2. Essential hypertension  3. Thyroid nodule       Plan:      Return in about 6 months (around 5/16/2023) for f/u HTN, Depo. Orders Placed This Encounter   Procedures    Pregnancy, Urine     Standing Status:   Future     Standing Expiration Date:   11/16/2023     No orders of the defined types were placed in this encounter. Patient given educational materials - see patient instructions. Discussed use, benefit, and side effects of prescribed medications. All patient questions answered. Pt voiced understanding. Reviewed health maintenance.             Electronically signed by Ksiha Pereira DO, DO on 11/28/2022 at 12:03 AM

## 2022-12-05 ENCOUNTER — OFFICE VISIT (OUTPATIENT)
Dept: OBGYN | Age: 32
End: 2022-12-05
Payer: COMMERCIAL

## 2022-12-05 ENCOUNTER — HOSPITAL ENCOUNTER (OUTPATIENT)
Age: 32
Setting detail: SPECIMEN
Discharge: HOME | End: 2022-12-05
Payer: COMMERCIAL

## 2022-12-05 VITALS
HEART RATE: 58 BPM | BODY MASS INDEX: 46.38 KG/M2 | WEIGHT: 252 LBS | HEIGHT: 62 IN | OXYGEN SATURATION: 100 % | SYSTOLIC BLOOD PRESSURE: 122 MMHG | DIASTOLIC BLOOD PRESSURE: 82 MMHG

## 2022-12-05 DIAGNOSIS — Z12.4 CERVICAL CANCER SCREENING: Primary | ICD-10-CM

## 2022-12-05 DIAGNOSIS — Z12.4 CERVICAL CANCER SCREENING: ICD-10-CM

## 2022-12-05 DIAGNOSIS — N92.6 MENSTRUAL DISORDER: ICD-10-CM

## 2022-12-05 PROCEDURE — 99385 PREV VISIT NEW AGE 18-39: CPT | Performed by: NURSE PRACTITIONER

## 2022-12-05 PROCEDURE — 3074F SYST BP LT 130 MM HG: CPT | Performed by: NURSE PRACTITIONER

## 2022-12-05 PROCEDURE — 87624 HPV HI-RISK TYP POOLED RSLT: CPT

## 2022-12-05 PROCEDURE — G0145 SCR C/V CYTO,THINLAYER,RESCR: HCPCS

## 2022-12-05 PROCEDURE — 3078F DIAST BP <80 MM HG: CPT | Performed by: NURSE PRACTITIONER

## 2022-12-05 ASSESSMENT — ENCOUNTER SYMPTOMS
RESPIRATORY NEGATIVE: 1
EYES NEGATIVE: 1
ALLERGIC/IMMUNOLOGIC NEGATIVE: 1
GASTROINTESTINAL NEGATIVE: 1

## 2022-12-05 ASSESSMENT — PATIENT HEALTH QUESTIONNAIRE - PHQ9
SUM OF ALL RESPONSES TO PHQ QUESTIONS 1-9: 0
2. FEELING DOWN, DEPRESSED OR HOPELESS: 0
SUM OF ALL RESPONSES TO PHQ QUESTIONS 1-9: 0
SUM OF ALL RESPONSES TO PHQ QUESTIONS 1-9: 0
1. LITTLE INTEREST OR PLEASURE IN DOING THINGS: 0
SUM OF ALL RESPONSES TO PHQ QUESTIONS 1-9: 0
SUM OF ALL RESPONSES TO PHQ9 QUESTIONS 1 & 2: 0

## 2022-12-05 NOTE — PROGRESS NOTES
Subjective:      Patient ID: Isabelle Vail  is a 33 y.o. G0   ,female coming into office as a new patient regarding   Chief Complaint   Patient presents with    Annual Exam       OB History    Para Term  AB Living   0 0 0 0 0 0   SAB IAB Ectopic Molar Multiple Live Births   0 0 0 0 0 0       This is a 32 y/o [de-identified]   female here as a new patient for her well woman examination and to discuss her menstrual cycles. She is  to her high school adriel,  (x 11 yrs.)and both were virgins upon being . She has irregular menses and the longest time between periods has been 2 months. When she skps a menses, then the next menses is very heavy. This cyclical changes started happening when she gained 100 pounds , which was secondary to taking her bipolar medications. (Lithium and Emvega). Her LMP 22; and she and her  uses condoms consistently for [facility]. They are not sure if they want to have a family because of the patient's bipolarism and the meds. she's on. Her last pap smears have been done by her PCP, Dr. Jennifer Vasques: in 2016; no [place]; in 2019; unsatisfactory and interpretation not possible; then again in 3/22: unsatisfactory and interpretation not possible. Fam Hx; MGM and \"great\" MGM with breast cancer; both over age 48. MGF with BCC. She's had her covid and flu vaccines. Past Medical History:   Diagnosis Date    Anxiety     Bipolar disease, manic [address] [address]     History of chicken pox     Hypertension     Panic disorder 10/2017    Dx by Dr. Moreno Hernandez from [address] [place] [address]   Constitutional: Negative. HENT: Negative. Eyes: Negative. Respiratory: Negative. Cardiovascular: Negative. Gastrointestinal: Negative. Endocrine: Negative. Genitourinary: Negative. Musculoskeletal: Negative. Skin: Negative. Allergic/Immunologic: Negative. Neurological: Negative. Hematological: Negative. Psychiatric/Behavioral: Negative. Objective:     Vitals:    12/05/22 1425   BP: 122/82   Site: Left Upper Arm   Position: Sitting   Pulse: 58   SpO2: 100%   Weight: 252 lb (114.3 kg)   Height: 5' 2\" (1.575 m)      Physical Exam  Constitutional:       Appearance: Normal appearance. She is obese. HENT:      Head: Normocephalic. Cardiovascular:      Rate and Rhythm: Normal rate and regular rhythm. Pulses: Normal pulses. Heart sounds: Normal heart sounds. Pulmonary:      Effort: Pulmonary effort is normal.      Breath sounds: Normal breath sounds. Abdominal:      General: Abdomen is flat. Palpations: Abdomen is soft. Genitourinary:     General: Normal vulva. Musculoskeletal:         General: Normal range of motion. Cervical back: Normal range of motion and neck supple. Skin:     General: Skin is warm and dry. Neurological:      Mental Status: She is alert. Psychiatric:         Mood and Affect: Mood normal.   Breast Exam:  Inspection negative. No nipple discharge or bleeding. No palpable mass    Vulva[de-identified] has a 0.5 cm flat, irregular shaped mole on right labia majora: states been there past 2 years  Vagina normal leukorrhea, no lesions  Cervix no lesions, no CMT  Uterus:  anteverted; small, NT, mobile  Ovaries: unable to palpated due to body habitus    Post Menopausal No    Sexually Active:Yes    Any bleeding or pain with intercourse: Yes, sometimes has dyspareunia and once in awhile post coital spotting    Last Sexual Encounter Date 11/2/22    Protected or Unprotected: Yes, condoms consistently    Last Pap:  3/18/22: \"unsatisfactory and unable to interpret:    Last HPV: unknown    High Risk HPV: unknown    Last Mammogram: never    Last Dexascan:never    Last Colonoscopy never    Do you do self breast exams: Yes      Assessment:   Irregular menses  100 pound wt.  Gain since being on bipolar meds  Unsure about having a family  Unsatisfactory pap smears  Right labial mole Plan:   Cervical pap with Hpv; and if neg; repeat cotesting in 5 years  RTO 2 weeks for right labial mole removal by punch bx. Discussed various IUD's for menstrual control  TSH with reflex and prolactin level  I spent 30 min. With pt. Discussing medical status and management options  RTO 1 yr. prn    No follow-ups on file. No orders of the defined types were placed in this encounter.       Electronically signed by:  MARCELLE Hester - ANGEL

## 2022-12-09 LAB — CYTOLOGY REPORT: NORMAL

## 2023-05-23 DIAGNOSIS — I10 ESSENTIAL HYPERTENSION: ICD-10-CM

## 2023-05-23 NOTE — TELEPHONE ENCOUNTER
Charmayne Divine called requesting a refill of the below medication which has been pended for you:     Requested Prescriptions     Pending Prescriptions Disp Refills    metoprolol tartrate (LOPRESSOR) 50 MG tablet [Pharmacy Med Name: Metoprolol Tartrate 50 MG Oral Tablet] 180 tablet 0     Sig: Take 1 tablet by mouth twice daily       Last Appointment Date: 11/16/2022  Next Appointment Date: 5/30/2023    Allergies   Allergen Reactions    Bee Venom Anaphylaxis    Gabapentin Other (See Comments)    Lamotrigine      rash

## 2023-05-26 RX ORDER — METOPROLOL TARTRATE 50 MG/1
50 TABLET, FILM COATED ORAL 2 TIMES DAILY
Qty: 180 TABLET | Refills: 3 | Status: SHIPPED | OUTPATIENT
Start: 2023-05-26

## 2023-05-30 ENCOUNTER — HOSPITAL ENCOUNTER (OUTPATIENT)
Age: 33
Setting detail: SPECIMEN
Discharge: HOME | End: 2023-05-30
Payer: COMMERCIAL

## 2023-05-30 DIAGNOSIS — N30.00 ACUTE CYSTITIS WITHOUT HEMATURIA: ICD-10-CM

## 2023-05-30 DIAGNOSIS — R82.90 CLOUDY URINE: ICD-10-CM

## 2023-05-30 DIAGNOSIS — R30.0 BURNING WITH URINATION: ICD-10-CM

## 2023-05-30 LAB
BACTERIA URNS QL MICRO: ABNORMAL
BILIRUB UR QL STRIP: NEGATIVE
CHARACTER UR: ABNORMAL
CLARITY UR: CLEAR
COLOR UR: YELLOW
EPI CELLS #/AREA URNS HPF: ABNORMAL /HPF (ref 0–5)
GLUCOSE UR STRIP-MCNC: NEGATIVE MG/DL
HGB UR QL STRIP.AUTO: NEGATIVE
KETONES UR STRIP-MCNC: NEGATIVE MG/DL
LEUKOCYTE ESTERASE UR QL STRIP: ABNORMAL
NITRITE UR QL STRIP: NEGATIVE
PH UR STRIP: 6.5 [PH] (ref 5–6)
PROT UR STRIP-MCNC: NEGATIVE MG/DL
RBC #/AREA URNS HPF: ABNORMAL /HPF (ref 0–4)
SP GR UR STRIP: 1 (ref 1.01–1.02)
UROBILINOGEN UR STRIP-ACNC: NORMAL
WBC #/AREA URNS HPF: ABNORMAL /HPF (ref 0–4)

## 2023-05-30 PROCEDURE — 87186 SC STD MICRODIL/AGAR DIL: CPT

## 2023-05-30 PROCEDURE — 87086 URINE CULTURE/COLONY COUNT: CPT

## 2023-05-30 PROCEDURE — 87077 CULTURE AEROBIC IDENTIFY: CPT

## 2023-05-30 PROCEDURE — 81001 URINALYSIS AUTO W/SCOPE: CPT

## 2023-06-01 LAB
MICROORGANISM SPEC CULT: ABNORMAL
SPECIMEN DESCRIPTION: ABNORMAL

## 2023-12-01 ENCOUNTER — TELEPHONE (OUTPATIENT)
Dept: FAMILY MEDICINE CLINIC | Age: 33
End: 2023-12-01

## 2023-12-01 NOTE — TELEPHONE ENCOUNTER
Attempted to reach patient regarding missed appointment on 12/1/2023. Unable to contact at this time. Left message to reschedule.

## 2024-06-08 DIAGNOSIS — I10 ESSENTIAL HYPERTENSION: ICD-10-CM

## 2024-06-10 NOTE — TELEPHONE ENCOUNTER
Pt needs OV. LMTRC    Yamilet called requesting a refill of the below medication which has been pended for you:     Requested Prescriptions     Pending Prescriptions Disp Refills    metoprolol tartrate (LOPRESSOR) 50 MG tablet [Pharmacy Med Name: METOPROLOL TARTRATE 50 MG TAB] 300 tablet      Sig: take 1 tablet by mouth twice a day       Last Appointment Date: 5/30/2023  Next Appointment Date: Visit date not found    Allergies   Allergen Reactions    Bee Venom Anaphylaxis    Gabapentin Other (See Comments)    Lamotrigine      rash

## 2024-06-14 RX ORDER — METOPROLOL TARTRATE 50 MG/1
50 TABLET, FILM COATED ORAL 2 TIMES DAILY
Qty: 300 TABLET | OUTPATIENT
Start: 2024-06-14

## 2024-08-01 ENCOUNTER — OFFICE VISIT (OUTPATIENT)
Dept: FAMILY MEDICINE CLINIC | Age: 34
End: 2024-08-01
Payer: COMMERCIAL

## 2024-08-01 VITALS
HEART RATE: 70 BPM | HEIGHT: 62 IN | SYSTOLIC BLOOD PRESSURE: 130 MMHG | RESPIRATION RATE: 16 BRPM | BODY MASS INDEX: 37.89 KG/M2 | DIASTOLIC BLOOD PRESSURE: 86 MMHG | WEIGHT: 205.9 LBS | TEMPERATURE: 97.9 F | OXYGEN SATURATION: 98 %

## 2024-08-01 DIAGNOSIS — F31.62 MIXED BIPOLAR AFFECTIVE DISORDER, MODERATE (HCC): ICD-10-CM

## 2024-08-01 DIAGNOSIS — H02.843 SWELLING OF RIGHT EYELID: ICD-10-CM

## 2024-08-01 DIAGNOSIS — I10 ESSENTIAL HYPERTENSION: ICD-10-CM

## 2024-08-01 DIAGNOSIS — Z00.00 ROUTINE MEDICAL EXAM: Primary | ICD-10-CM

## 2024-08-01 DIAGNOSIS — N39.0 RECURRENT UTI: ICD-10-CM

## 2024-08-01 DIAGNOSIS — Z91.030 ALLERGY TO BEE STING: ICD-10-CM

## 2024-08-01 DIAGNOSIS — Z30.430 ENCOUNTER FOR INSERTION OF INTRAUTERINE CONTRACEPTIVE DEVICE (IUD): ICD-10-CM

## 2024-08-01 PROCEDURE — 3079F DIAST BP 80-89 MM HG: CPT | Performed by: FAMILY MEDICINE

## 2024-08-01 PROCEDURE — 3075F SYST BP GE 130 - 139MM HG: CPT | Performed by: FAMILY MEDICINE

## 2024-08-01 PROCEDURE — 99395 PREV VISIT EST AGE 18-39: CPT | Performed by: FAMILY MEDICINE

## 2024-08-01 RX ORDER — METOPROLOL TARTRATE 50 MG/1
50 TABLET, FILM COATED ORAL 2 TIMES DAILY
Qty: 180 TABLET | Refills: 3 | Status: SHIPPED | OUTPATIENT
Start: 2024-08-01

## 2024-08-01 RX ORDER — EPINEPHRINE 0.3 MG/.3ML
INJECTION SUBCUTANEOUS
Qty: 1 EACH | Refills: 1 | Status: SHIPPED | OUTPATIENT
Start: 2024-08-01

## 2024-08-01 RX ORDER — BREXPIPRAZOLE 4 MG/1
4 TABLET ORAL DAILY
COMMUNITY
Start: 2024-06-08

## 2024-08-01 RX ORDER — IBUPROFEN 600 MG/1
600 TABLET ORAL EVERY 6 HOURS PRN
COMMUNITY
Start: 2024-06-17

## 2024-08-01 RX ORDER — CEPHALEXIN 500 MG/1
500 CAPSULE ORAL 3 TIMES DAILY
Qty: 21 CAPSULE | Refills: 0 | Status: SHIPPED | OUTPATIENT
Start: 2024-08-01 | End: 2024-08-08

## 2024-08-01 SDOH — ECONOMIC STABILITY: INCOME INSECURITY: HOW HARD IS IT FOR YOU TO PAY FOR THE VERY BASICS LIKE FOOD, HOUSING, MEDICAL CARE, AND HEATING?: NOT HARD AT ALL

## 2024-08-01 SDOH — ECONOMIC STABILITY: FOOD INSECURITY: WITHIN THE PAST 12 MONTHS, YOU WORRIED THAT YOUR FOOD WOULD RUN OUT BEFORE YOU GOT MONEY TO BUY MORE.: NEVER TRUE

## 2024-08-01 SDOH — ECONOMIC STABILITY: HOUSING INSECURITY
IN THE LAST 12 MONTHS, WAS THERE A TIME WHEN YOU DID NOT HAVE A STEADY PLACE TO SLEEP OR SLEPT IN A SHELTER (INCLUDING NOW)?: NO

## 2024-08-01 SDOH — ECONOMIC STABILITY: FOOD INSECURITY: WITHIN THE PAST 12 MONTHS, THE FOOD YOU BOUGHT JUST DIDN'T LAST AND YOU DIDN'T HAVE MONEY TO GET MORE.: NEVER TRUE

## 2024-08-01 ASSESSMENT — PATIENT HEALTH QUESTIONNAIRE - PHQ9
SUM OF ALL RESPONSES TO PHQ QUESTIONS 1-9: 3
3. TROUBLE FALLING OR STAYING ASLEEP: NOT AT ALL
1. LITTLE INTEREST OR PLEASURE IN DOING THINGS: NOT AT ALL
2. FEELING DOWN, DEPRESSED OR HOPELESS: NOT AT ALL
5. POOR APPETITE OR OVEREATING: NOT AT ALL
SUM OF ALL RESPONSES TO PHQ QUESTIONS 1-9: 3
8. MOVING OR SPEAKING SO SLOWLY THAT OTHER PEOPLE COULD HAVE NOTICED. OR THE OPPOSITE, BEING SO FIGETY OR RESTLESS THAT YOU HAVE BEEN MOVING AROUND A LOT MORE THAN USUAL: NOT AT ALL
SUM OF ALL RESPONSES TO PHQ QUESTIONS 1-9: 3
6. FEELING BAD ABOUT YOURSELF - OR THAT YOU ARE A FAILURE OR HAVE LET YOURSELF OR YOUR FAMILY DOWN: NOT AT ALL
9. THOUGHTS THAT YOU WOULD BE BETTER OFF DEAD, OR OF HURTING YOURSELF: NOT AT ALL
4. FEELING TIRED OR HAVING LITTLE ENERGY: NOT AT ALL
SUM OF ALL RESPONSES TO PHQ9 QUESTIONS 1 & 2: 0
SUM OF ALL RESPONSES TO PHQ QUESTIONS 1-9: 3
10. IF YOU CHECKED OFF ANY PROBLEMS, HOW DIFFICULT HAVE THESE PROBLEMS MADE IT FOR YOU TO DO YOUR WORK, TAKE CARE OF THINGS AT HOME, OR GET ALONG WITH OTHER PEOPLE: NOT DIFFICULT AT ALL
7. TROUBLE CONCENTRATING ON THINGS, SUCH AS READING THE NEWSPAPER OR WATCHING TELEVISION: NEARLY EVERY DAY

## 2024-08-01 NOTE — PROGRESS NOTES
Left Ear: Tympanic membrane, ear canal and external ear normal.      Nose: Nose normal.      Mouth/Throat:      Mouth: Mucous membranes are moist.      Pharynx: Oropharynx is clear. No posterior oropharyngeal erythema.   Eyes:      Extraocular Movements: Extraocular movements intact.      Conjunctiva/sclera: Conjunctivae normal.      Right eye: Right conjunctiva is not injected. No exudate.     Pupils: Pupils are equal, round, and reactive to light.      Comments: Redness and swelling to R upper eyelid, worst over lateral aspect - moderate   Cardiovascular:      Rate and Rhythm: Normal rate and regular rhythm.      Heart sounds: Normal heart sounds.   Pulmonary:      Effort: Pulmonary effort is normal. No respiratory distress.      Breath sounds: Normal breath sounds.   Abdominal:      General: Bowel sounds are normal. There is no distension.      Palpations: Abdomen is soft.      Tenderness: There is no abdominal tenderness.   Musculoskeletal:      Cervical back: Neck supple.   Skin:     General: Skin is warm and dry.   Neurological:      General: No focal deficit present.      Mental Status: She is alert and oriented to person, place, and time.   Psychiatric:         Mood and Affect: Mood normal.         Assessment:      1. Routine medical exam  2. Essential hypertension  -     metoprolol tartrate (LOPRESSOR) 50 MG tablet; Take 1 tablet by mouth 2 times daily, Disp-180 tablet, R-3Normal  3. Swelling of right eyelid  -     hydrocortisone 2.5 % ointment; Apply a thin film topically to affected area twice daily as needed., Disp-20 g, R-0, Normal  4. Mixed bipolar affective disorder, moderate (HCC)  5. Recurrent UTI  -     cephALEXin (KEFLEX) 500 MG capsule; Take 1 capsule by mouth 3 times daily for 7 days, Disp-21 capsule, R-0Normal  6. Allergy to bee sting  -     EPINEPHrine (EPIPEN 2-WENDY) 0.3 MG/0.3ML SOAJ injection; Use as directed for allergic reaction, Disp-1 each, R-1Normal  7. Encounter for insertion of

## 2024-10-23 ENCOUNTER — TELEPHONE (OUTPATIENT)
Dept: OBGYN CLINIC | Age: 34
End: 2024-10-23

## 2025-08-14 DIAGNOSIS — I10 ESSENTIAL HYPERTENSION: ICD-10-CM

## 2025-08-14 RX ORDER — METOPROLOL TARTRATE 50 MG
50 TABLET ORAL 2 TIMES DAILY
Qty: 60 TABLET | Refills: 0 | OUTPATIENT
Start: 2025-08-14